# Patient Record
Sex: MALE | Race: BLACK OR AFRICAN AMERICAN | Employment: OTHER | URBAN - METROPOLITAN AREA
[De-identification: names, ages, dates, MRNs, and addresses within clinical notes are randomized per-mention and may not be internally consistent; named-entity substitution may affect disease eponyms.]

---

## 2020-12-26 ENCOUNTER — HOME HEALTH ADMISSION (OUTPATIENT)
Dept: HOME HEALTH SERVICES | Facility: HOME HEALTH | Age: 70
End: 2020-12-26

## 2023-07-14 ENCOUNTER — HOME HEALTH ADMISSION (OUTPATIENT)
Age: 73
End: 2023-07-14
Payer: COMMERCIAL

## 2023-07-16 ENCOUNTER — HOME CARE VISIT (OUTPATIENT)
Age: 73
End: 2023-07-16
Payer: COMMERCIAL

## 2023-07-16 PROCEDURE — 0221000100 HH NO PAY CLAIM PROCEDURE

## 2023-07-16 PROCEDURE — G0299 HHS/HOSPICE OF RN EA 15 MIN: HCPCS

## 2023-07-17 VITALS
TEMPERATURE: 97.5 F | OXYGEN SATURATION: 98 % | SYSTOLIC BLOOD PRESSURE: 110 MMHG | RESPIRATION RATE: 14 BRPM | DIASTOLIC BLOOD PRESSURE: 60 MMHG | HEART RATE: 78 BPM

## 2023-07-17 ASSESSMENT — ENCOUNTER SYMPTOMS
HEMOPTYSIS: 0
BOWEL INCONTINENCE: 1
PAIN LOCATION - PAIN QUALITY: BURNING
STOOL DESCRIPTION: SOFT FORMED

## 2023-07-17 NOTE — CASE COMMUNICATION
SOC done on 7/16/23. Patient is quadrapeligic, he is cared very well by his sister. He has some wounds on bilateral lower legs. (See pics.)   im recommending DWC/Aquacel ag and DSD 3x/week. He has Power and manual Wheelchairs, and Summer lift and Hospital bed.

## 2023-07-17 NOTE — HOME HEALTH
importance of keeping thrasher bag below the level of the bladder. I also went over the need to empty bag when < 1/2 full. I stressed the importance of thorough thrasher care at meatus and making sure tubing does not kink. Patient is a fall risk, I recommend supervision at all times, keeping walk ways/halls clear of clutter. Patient to change positions frequently and keep skin clean dry and moisturized to prevent any skin breakdown, using a Zinc product on prominent areas. Keep dressings clean and dry. Observe for signs of infection. Wounds assessed and Pictures in chart. 4 wounds noted. Sacral decubiti is healed with no open areas. Patient level of understanding of education provided: Patient /CG with good understanding of education provided. Patient response to procedure performed: Patient was  uncomfortable with dressing changes, skin is very fragile. Home exercise program/Homework provided: Continue to eat a diet high in protein to promote healing. Keep pressure off prominent areas, keep skin clean and dry, change his position frequently, F/U with PCP and take medications as prescribed. Pt/Caregiver instructed on plan of care and are agreeable to plan of care at this time. Physician Angel Chacko notified of patient admission to home health and plan of care including anticipated frequency of visits and treatments/interventions/modalities of SN/PT/OT. Discharge planning discussed with patient and caregiver. Discharge planning as follows: Will discharge when education is completed,  patient is medically stable and wound is healed, or family can manage dressing. Rm Larsen Pt/Caregiver did verbalize understanding of discharge planning. Next MD appointment TBD  with Angel Chacko MD.  Patient/caregiver encouraged/instructed to keep appointment as lack of follow through with physician appointment could result in discontinuation of home care services for non-compliance.

## 2023-07-18 ENCOUNTER — HOME CARE VISIT (OUTPATIENT)
Age: 73
End: 2023-07-18
Payer: COMMERCIAL

## 2023-07-18 VITALS
OXYGEN SATURATION: 94 % | SYSTOLIC BLOOD PRESSURE: 120 MMHG | HEART RATE: 62 BPM | DIASTOLIC BLOOD PRESSURE: 62 MMHG | RESPIRATION RATE: 14 BRPM | TEMPERATURE: 96.8 F

## 2023-07-18 PROCEDURE — G0151 HHCP-SERV OF PT,EA 15 MIN: HCPCS

## 2023-07-18 NOTE — HOME HEALTH
chaning position every Hr for pressure relie  PATIENT EDUCATION PROVIDED THIS VISIT: safety, HEP, walking, deep breathing, Educated pt sister how to float B heels to help with pressure relief   HEP consisting of:  1. Change positon  every hour during the day   2. PROM/ AAROM program B LE daily   Written HEP issued, patient/caregiver verbalized understanding. CONTINUED NEED FOR THE FOLLOWING SKILLS: HH PT is medically necessary to  address pain, decreased ROM,, and impaired balance in order to improve functional independence, quality of life, return to PLOF, and reduce the risk for falls. PLAN: Pt will be seen to establish and upgrade  and educated caregivers in a HEP PROM/ AAROM B LE . Static sitting  balance re -education on the EOB  Reinforce pressure relieving technique . Reinforce general safety precautions  DISCHARGE PLANNING DISCUSSED: Discharge to self and family under MD supervision once all goals have been met or patient has reached max potential. Patient/caregiver verbalized understanding.

## 2023-07-20 ENCOUNTER — HOME CARE VISIT (OUTPATIENT)
Age: 73
End: 2023-07-20
Payer: COMMERCIAL

## 2023-07-20 VITALS
TEMPERATURE: 98.1 F | OXYGEN SATURATION: 95 % | HEART RATE: 77 BPM | SYSTOLIC BLOOD PRESSURE: 138 MMHG | DIASTOLIC BLOOD PRESSURE: 80 MMHG

## 2023-07-20 PROCEDURE — G0157 HHC PT ASSISTANT EA 15: HCPCS

## 2023-07-20 ASSESSMENT — ENCOUNTER SYMPTOMS: PAIN LOCATION - PAIN QUALITY: SHARP

## 2023-07-21 ENCOUNTER — HOME CARE VISIT (OUTPATIENT)
Age: 73
End: 2023-07-21
Payer: COMMERCIAL

## 2023-07-21 PROCEDURE — G0300 HHS/HOSPICE OF LPN EA 15 MIN: HCPCS

## 2023-07-21 NOTE — HOME HEALTH
SUBJECTIVE: The pt reports increased neck pain with all mvmts especially with LLE ROM. His sister states that this is not new and that this is what he says when he does not want to do something. Kat Garcia PAIN: see pain assessment  OBJECTIVE: see interventions  . NEXT MD APPT: 7/25/23  . CAREGIVER ASSISTANCE NEEDED FOR: The pt lives with his sister who is his primary CG and he also has a paid CG to assist. He is D with all care. .  ASSESSMENT AND PROGRESS TOWARD GOALS:  The pt had difficulty tolerating participation in today's treatment secondary to increased neck pain with all mvmts especially ROM of the LLE. The pt/CG will benefit from continued HHPT to progress the CGs ability to safely (for her and the pt) perform ROM and progress towards sitting over the EOB, reducing the pts risks of skin breakdown and pain/stiffness. PATIENT RESPONSE TO TREATMENT: increased neck pain with activity. PATIENT LEVEL OF UNDERSTANDING OF EDUCATION: The pts sister is able to teach back pressure relief techniques and ROM of the joints after education. [address] LAST COMPLETED ON:  The pts status and POC discussed with Scott Watkins PT on 7/19/23. Kat Garcia PLAN: Plan to review the ROM and sit over the EOB, if tolerated, next visit. DISCHARGE PLANNING DISCUSSED: Discharge to self and family under MD supervision once all goals have been met or patient has reached maximum potential. Discussed with the pt and the pts sister the POC to continue HHPT with the remaining frequency of 2w2. Both are in agreement to the POC at this time.

## 2023-07-22 ENCOUNTER — HOME CARE VISIT (OUTPATIENT)
Age: 73
End: 2023-07-22
Payer: COMMERCIAL

## 2023-07-24 ENCOUNTER — HOME CARE VISIT (OUTPATIENT)
Age: 73
End: 2023-07-24
Payer: COMMERCIAL

## 2023-07-24 VITALS
SYSTOLIC BLOOD PRESSURE: 122 MMHG | DIASTOLIC BLOOD PRESSURE: 80 MMHG | HEART RATE: 67 BPM | OXYGEN SATURATION: 96 % | TEMPERATURE: 98 F

## 2023-07-24 PROCEDURE — G0300 HHS/HOSPICE OF LPN EA 15 MIN: HCPCS

## 2023-07-24 PROCEDURE — G0157 HHC PT ASSISTANT EA 15: HCPCS

## 2023-07-24 ASSESSMENT — ENCOUNTER SYMPTOMS: STOOL DESCRIPTION: SOFT FORMED

## 2023-07-24 NOTE — HOME HEALTH
Skilled reason for visit:Wound Care, Hypertension, Catheter Care, Infection Prevention, Skin Impairment, and Medication Education     Caregiver involvement: Patient's sister is his primary caregiver and she schedules his appointments, and schedules his transportation to and from his appointments. Patients sister  his medications and bathes patient bathes. Patient has a [place] Catheter for urination and is changed whenever he soils himself. His sister  prepares her meals and feeds patient as well. Medications reviewed and all medications ARE NOT available in the home this visit. The following education was provided regarding medications: Plavix- Caregiver was educated on this medication and the purpose of it. Patient/Caregiver verbalized understanding    MD notified of any discrepancies/look a-like medications/medication interactions: NA    Medications are EFFECTIVE at this time. Home health supplies by type and quantity ordered/delivered this visit include: Supplies in Home    Patient education provided this visit: See interventions Tab. Sharps education provided: NA    Patient level of understanding of education provided: Patient verbalized understanding to all education in interventions tab. Skilled Care Performed this visit: Vital Assessment, Disease, Medication, Safety and Infection Prevention, Education and Management    Patient response to procedure performed:   Patient tolerated vital assessment well and no facial grimaces or complaints of pain or discomfort. Agency Progress toward goals: Agency staff is progressing well with patient as patient verbalizes being able to better manage her disease processes with the education received from home care staff. Patient's Progress towards personal goals:  Patient is progressing slowly as wounds are still bleeding and will need more time to heel.     Home exercise program: Patients caregiver will increase his protein intake in his diet to

## 2023-07-26 ENCOUNTER — HOME CARE VISIT (OUTPATIENT)
Age: 73
End: 2023-07-26
Payer: COMMERCIAL

## 2023-07-26 VITALS
TEMPERATURE: 98 F | OXYGEN SATURATION: 96 % | HEART RATE: 67 BPM | SYSTOLIC BLOOD PRESSURE: 122 MMHG | DIASTOLIC BLOOD PRESSURE: 80 MMHG

## 2023-07-26 PROCEDURE — G0300 HHS/HOSPICE OF LPN EA 15 MIN: HCPCS

## 2023-07-26 ASSESSMENT — ENCOUNTER SYMPTOMS: PAIN LOCATION - PAIN QUALITY: ACHE, SHARP

## 2023-07-26 NOTE — HOME HEALTH
SUBJECTIVE: The pts CG not available during today's visit. Discussed with the pt that CG needs to be available for visits for teaching. Diamond Hamman PAIN: see pain assessment    OBJECTIVE: see interventions  . NEXT MD APPT: ROM  . CAREGIVER ASSISTANCE NEEDED FOR: The pt lives with his sister who is his primary CG and he also has a paid CG to assist. He is D with all care. .  ASSESSMENT AND PROGRESS TOWARD GOALS:  The pt demonstrated a fair result in HHPT on this date due to posterior pushing with the attempts for supine to sit transfers. He continues to present with wounds that require increased CG assistance. He will benefit from increased frequency of pressure relief techniques to assist with wound healing and reducing the risk of further skin breakdown. PATIENT RESPONSE TO TREATMENT: posterior pushing with inability to safely perform supine to sit    PATIENT/CG LEVEL OF UNDERSTANDING OF EDUCATION: CG unavailable on this date. [address] LAST COMPLETED ON:  The pts status and POC discussed with Rain Simon LPN on this date. Diamond Hamman PLAN: PROM and sitting balance next visit. Diamond Hamman DISCHARGE PLANNING DISCUSSED: Discharge to self and family under MD supervision once all goals have been met or patient has reached maximum potential. Discussed with the pt and the pts sister the POC to continue HHPT with the remaining frequency of 1 more visit this week then 2w1. Both are in agreement to the POC at this time.

## 2023-07-27 VITALS
RESPIRATION RATE: 18 BRPM | HEART RATE: 87 BPM | DIASTOLIC BLOOD PRESSURE: 66 MMHG | OXYGEN SATURATION: 100 % | SYSTOLIC BLOOD PRESSURE: 110 MMHG | TEMPERATURE: 98.1 F

## 2023-07-27 ASSESSMENT — ENCOUNTER SYMPTOMS: BOWEL INCONTINENCE: 1

## 2023-07-27 NOTE — HOME HEALTH
Skilled reason for visit Wound Care                 Caregiver involvement: Patient sister is primary CG assist with all level of care           Medications reviewed and all medications are available in the home this visit. The following education was provided regarding medication Medication reviewed no changes question or concerns       MD notified of any discrepancies/look a-like medications/medication interactions: n/a         Medications are effective at this time. Home health supplies by type and quantity ordered/delivered this visit include: n/a         Patient education provided this visit: see care plan intervention            sharps education provided n/a  Patient level of understanding of education provided:  see care plan intervention    Patient response to procedure performed:  Tolerated well           Agency Progress toward goals: Continue to promote wound healing          Patient's Progress towards personal goals: progressing             Home exercise program: Keep wound clean dry and covered between visits          Continued need for the following skills: Nursing         Plan for next visit: wound care      Patient and/or caregiver notified and agrees to changes in the Plan of Care YES/NO/NA: YES        The following discharge planning was discussed with the pt/caregiver: D/C when wound healed

## 2023-07-28 ENCOUNTER — HOME CARE VISIT (OUTPATIENT)
Age: 73
End: 2023-07-28
Payer: COMMERCIAL

## 2023-07-28 VITALS
OXYGEN SATURATION: 97 % | TEMPERATURE: 97.8 F | HEART RATE: 66 BPM | DIASTOLIC BLOOD PRESSURE: 88 MMHG | SYSTOLIC BLOOD PRESSURE: 142 MMHG

## 2023-07-28 PROCEDURE — G0157 HHC PT ASSISTANT EA 15: HCPCS

## 2023-07-28 PROCEDURE — G0300 HHS/HOSPICE OF LPN EA 15 MIN: HCPCS

## 2023-07-28 NOTE — HOME HEALTH
SUBJECTIVE: The pt lying in the bed upon today's arrival. He has a friend present. His sister and the paid CG are not available on this date. He states his paid CG is present in the morning, scheduled the next PT visit in the morning for CG education. The pt states he spent 5 hours in the recliner yesterday. He states his sister and the CG are (I) with the ues of the sergei lift to transfer him. Deja Zayas PAIN: see pain assessment  OBJECTIVE: see interventions  . NEXT MD APPT: ROM  . CAREGIVER ASSISTANCE NEEDED FOR: The pt lives with his sister who is his primary CG and he also has a paid CG to assist. He is D with all care. .  ASSESSMENT AND PROGRESS TOWARD GOALS:  The pt unable to tolerate supine to sit transition, he continues to present with limited motion reducing the ability to transfer, requiring D assistance for all mobility. He will benefit from [facility] education for daily ROM. He continues to have decreased tolerance to PROM of the hips due to increased pain. PATIENT RESPONSE TO TREATMENT: attempted supine to sit transition again today without success due to posterior pushing. PATIENT/CG LEVEL OF UNDERSTANDING OF EDUCATION: CG unavailable on this date. Deja Zayas PLAN: CG education for PROM  . DISCHARGE PLANNING DISCUSSED: Discharge to self and family under MD supervision once all goals have been met or patient has reached maximum potential. Discussed with the pt and the pts sister the POC to continue HHPT with the remaining frequency of  2w1. Both are in agreement to the POC at this time.

## 2023-07-30 ASSESSMENT — ENCOUNTER SYMPTOMS: PAIN LOCATION - PAIN QUALITY: SORE, ACHE

## 2023-07-31 ENCOUNTER — HOME CARE VISIT (OUTPATIENT)
Age: 73
End: 2023-07-31
Payer: COMMERCIAL

## 2023-07-31 VITALS
OXYGEN SATURATION: 95 % | SYSTOLIC BLOOD PRESSURE: 130 MMHG | HEART RATE: 83 BPM | DIASTOLIC BLOOD PRESSURE: 58 MMHG | RESPIRATION RATE: 17 BRPM | TEMPERATURE: 97.6 F

## 2023-07-31 PROCEDURE — G0299 HHS/HOSPICE OF RN EA 15 MIN: HCPCS

## 2023-07-31 ASSESSMENT — ENCOUNTER SYMPTOMS
PAIN LOCATION - PAIN QUALITY: ACHIN
BOWEL INCONTINENCE: 1

## 2023-07-31 NOTE — HOME HEALTH
Skilled reason for visit: wound care    Caregiver involvement: sister perform wound care non nursing days for breakthrough drainage. Medications reviewed and all medications are available in the home this visit. The following education was provided regarding medications:  reviewed all medication bottles in home for frequency, side effects and precautions. verbalized understanding        Medications are effective at this time. Home health supplies by type and quantity ordered/delivered this visit include: wound care supplies ordered. Patient education provided this visit: see intervention tab. Patient level of understanding of education provided: see intervention tab for level of understanding. Patient response to procedure performed:  tolerated with mild complaints of pain during wound care. Agency Progress toward goals: see intervention tab for progressing toward goals          Patient's Progress towards personal goals: see intervention tab for progressing toward goals          Home exercise program: cont to take all medications/diet as prescribed, follow pressure ulcer precautions, follow all fall precautions and use any assistive devices recommended by therapy or medical providers, reported any abnormal s/sx of  infection/UTI/Sepsis to ER immediately. disease process teaching packets/ home care booklet for reference. call home care for any concerns/questions. keep all medical appts. Continued need for the following skills: Nursing. Plan for next visit: wound care    Patient and/or caregiver notified and agrees to changes in the Plan of Care: N/A.      The following discharge planning was discussed with the pt/caregiver: when all wound care goals are met

## 2023-08-01 ENCOUNTER — HOME CARE VISIT (OUTPATIENT)
Age: 73
End: 2023-08-01
Payer: COMMERCIAL

## 2023-08-01 PROCEDURE — G0157 HHC PT ASSISTANT EA 15: HCPCS

## 2023-08-02 ENCOUNTER — HOME CARE VISIT (OUTPATIENT)
Age: 73
End: 2023-08-02
Payer: COMMERCIAL

## 2023-08-02 VITALS
HEART RATE: 78 BPM | SYSTOLIC BLOOD PRESSURE: 126 MMHG | TEMPERATURE: 96.9 F | DIASTOLIC BLOOD PRESSURE: 72 MMHG | RESPIRATION RATE: 16 BRPM | OXYGEN SATURATION: 99 %

## 2023-08-02 VITALS
HEART RATE: 66 BPM | TEMPERATURE: 98.1 F | OXYGEN SATURATION: 99 % | SYSTOLIC BLOOD PRESSURE: 122 MMHG | DIASTOLIC BLOOD PRESSURE: 84 MMHG

## 2023-08-02 PROCEDURE — G0151 HHCP-SERV OF PT,EA 15 MIN: HCPCS

## 2023-08-02 PROCEDURE — G0152 HHCP-SERV OF OT,EA 15 MIN: HCPCS

## 2023-08-02 PROCEDURE — G0300 HHS/HOSPICE OF LPN EA 15 MIN: HCPCS

## 2023-08-02 ASSESSMENT — ENCOUNTER SYMPTOMS
PAIN LOCATION - PAIN QUALITY: BURNING
PAIN LOCATION - PAIN QUALITY: SHARP, ACHE

## 2023-08-02 NOTE — HOME HEALTH
SUBJECTIVE: Pt reports that he is doing well today. REQUIRES CAREGIVER ASSISTANCE FOR: transportation, medication, ADLS. IADLS   MEDICATIONS REVIEWED AND RECONCILED no changes   NEXT MD APPT:  DR. Askew Daughters: R hip flexors, ADD/ABD 3/5 R quads and hamstrings -3/5 R DF 1/5 PF +1/5  L hip flexors -2/5 ADD/ABD -2/5 quads +1/5 hamstrings 1/5 DF0/5 PF 2/5   WOUNDS:Pt has wounds on B lower legs that are clean dry and intact and L lower leg wrapped with ace wrap R LE wrapped with kerlix    BED MOBILITY:pt is dependent with all bed mobilty skills   TRANSFERS:Per PTA visit 7/24/23 attempted supine to sit transfers without success as the pt pushed posteriorly. Pt sister repots that she is I with sergei lift transfers    BALANCE: Pt was unable to sit on the EOB  PATIENT RESPONE TO TX: Pt pain level remained the same throghout tx session   PATIENT LEVEL OF UNDERSTANDING OF EDUCATION PROVIDED Educated pt and family to change pt position every HR for pressure relief   PATIENT EDUCATION PROVIDED THIS VISIT: safety, HEP, walking, deep breathing,        Patient is s/p Quadriplegia and has been treated for establishing and education in  PROM/ AAROM progam. Education in proper positioning and pressure relieving program. Pt was dependent for all functional mobility. Pt sister Gege Newsome verbalzied understaning of PROM/ARROM program. B LE. Pt sister verbalized I using the sergei lift to A with transfers to power wc and relciner chair. Gege Newsome reported that they were had no questions with reguards to sergei lift. Discussed changing the postion of the bed a pt has a R gauze preference. Charisse Shaffer reports that they are going to move the bed to the other side of the room. On [facility] R hip flexors, ADD/ABD 3/5 R quads and hamstrings -3/5 R DF 1/5 PF +1/5  L hip flexors -2/5 ADD/ABD -2/5 quads +1/5 hamstrings 1/5 DF0/5 PF 2/5.  Today at DC strength is R hip flexors, ADD/ABD 3/5 R quads and hamstrings -3/5 R DF 1/5 PF +1/5  L hip flexors -2/5

## 2023-08-02 NOTE — CASE COMMUNICATION
At this time the pt is unable to sit over the EOB due to decreased trunk ROM. His sister is able to perform PROM and demonstrates good awareness for pressure relief with changing of posiitons. He will benefit from daily stretching to improve joint mobility and assist with pain management. I suggested that the pts sister move the bed as he is leaned to the R more often than not when I arrive due to the TV being to his right.

## 2023-08-02 NOTE — HOME HEALTH
SUBJECTIVE: The pt reports an ER visit this weekend. Has UTI. Mini Grijalva PAIN: see pain assessment    OBJECTIVE: see interventions  . NEXT MD APPT: ROM  . CAREGIVER ASSISTANCE NEEDED FOR: The pt lives with his sister who is his primary CG and he also has a paid CG to assist. He is D with all care. .  ASSESSMENT AND PROGRESS TOWARD GOALS:  At this time the pt is unable to sit over the EOB due to decreased trunk ROM. His sister is able to perform PROM and demonstrates good awareness for pressure relief with changing of posiitons. He will benefit from daily stretching to improve joint mobility and assist with pain management. PATIENT RESPONSE TO TREATMENT: attempted supine to sit transition again today without success due to posterior pushing. PATIENT/CG LEVEL OF UNDERSTANDING OF EDUCATION: Good understanding of pressure relief and PROM/stretching from the pts sister via teach back and demonstration. Mini Plants PLAN: Reassessment with anticipated DC next visit with Sherice Myers PT next visit. Mini Plants DISCHARGE PLANNING DISCUSSED: Discharge to self and family under MD supervision once all goals have been met or patient has reached maximum potential. Discussed with the pt and the pts sister the POC to continue HHPT with the remaining frequency of 1 more visit this week. Both are in agreement to the POC at this time.

## 2023-08-03 VITALS
HEART RATE: 78 BPM | DIASTOLIC BLOOD PRESSURE: 72 MMHG | OXYGEN SATURATION: 99 % | RESPIRATION RATE: 16 BRPM | TEMPERATURE: 96.9 F | SYSTOLIC BLOOD PRESSURE: 126 MMHG

## 2023-08-03 ASSESSMENT — ENCOUNTER SYMPTOMS: PAIN LOCATION - PAIN QUALITY: DULL

## 2023-08-03 NOTE — HOME HEALTH
Skilled Needs: Wound Care and education   Caregiver involvement: Pt is dependent on caregiver for all ADLS, medication and appointments   Medications reviewed and all medications are available in the home this visit. The following education was provided regarding medications:  DANIEL RITCHIE notified of any discrepancies/look a-like medications/medication interactions: DANIEL  Medications are effecrtive at this time. Home health supplies by type and quantity ordered/delivered this visit include:  Supplies ordered and available at this time   Nurse complete wound care with the assistance of caregiver. Minimal pain voiced during dressing change. Nurse educated sister on s/s of infection in wound and when to call MD. Sister inquired about thrasher changes if complications and can nursing complete it. Will call MD for order for PRN thrasher changes educated on s/s of infection to include  chills, increased heart/respiratory rate, uncontrolled pain/tenderness, drainage:green/yellow/brown, or odor to MD immediately. Set days and times for future viists with sister.    Sharps education provided: DANIEL  Patient level of understanding of education provided: Jayashree [place] all understanding to above education  Skilled Care Performed this visit: Education and Wound Care  Patient response to procedure performed: Minimal pain during dressing change   Agency Progress toward goals: Progressing toward interventions above  Patient's Progress towards personal goals: when patient reaches goals and medication is managed, and disease processes are understood patient agrees and understand that discharge will take place

## 2023-08-03 NOTE — CASE COMMUNICATION
Occupational Therapy Evaluation    1w1    Mr. Gautam Dean is not ambulatory. He requires dependent assistance for all bed mobility and transfers to the w/c. Sister Bahman Wynne reports independence with assisting pt with all bed mobility and use of the sergei lift for transfers to the w/c. Mr. Gautam Dean is dependent for all ADL tasks to include grooming, bathing, dressing and toileting. All ADL completed with pt in supine for pt and caregiver saf etalan. Mr. Gautam Dean is able to bring his L hand to his mouth, but is unable to grasp items with his hand due to severe contracture and joint limitations. PROM of the L UE is severely limited. Shoulder able to passively lift approximately 20% range. Elbow lacks full extension and rests in flexion. Able to extend 40% range. Wrist and fingers fixed in neutral.  Sister reports that she sometimes wedges a cookie/food between his fingers an d allows him to feed himself. However, pt is unable to manipulate food in his hand unassisted. The R UE is fixed in extension with very minimal movement during attempts at PROM for flexion. Explained to pt and sister that daily PROM within available range is needed to prevent further joint contractures and to promote ease during ADL tasks. Offered to pick pt up for further OT visits to instruct caregivers on how to perform ROM while  managing pt's muscle tone along with ADL training. Sister states that she does not need training as she is independent doing this. She denies doing the ROM daily but states that she does it several times a week. Bahman Wynne explains that is why they wanted therapy orders. So therapy could come and do the exercises with Mr. Gautam Dean. Explained that doing only ROM daily is not a skilled service. Explained the skill is to instruct caregiv ers with plan for them to carryover daily. Bahman Wynne is asking OT to help pt be able to improve pts ROM so that he can functionally use his UE with daily tasks.   Unfortunately, pt has

## 2023-08-03 NOTE — HOME HEALTH
Caregiver involvement: Junior Sevilla (sister) is pt's caregiver and assists with all ADL. Medications reviewed and all medications are available in the home this visit. The following education was provided regarding medications, medication interactions, and look alike medications (specify): Continue as directed by MD.    Medications  are effective at this time. Patient education provided this visit: see ADL note    Sharps education provided:  na    Patient level of understanding of education provided: see ADL note    Skilled Care Performed this visit: Completed OT evaluation and assessment for safety with ADL and mobility. Patient response to procedure performed:  Mr. Mackenzie Calderon had a fair response to therapy. He was agreeable to participate but unable to tolerate full PROM due to spasticity and pain. Patient's Progress towards personal goals: Mr. Mackenzie Calderon has poor rehab potential.  He is unable to actively participate due to severe spasticity and limited mobility. He requires full caregiver support; however, caregiver declines further OT for caregiver training at this time. Pt is dependent for all ADL and mobility. Caregiver reports independence in their ability to assist pt. Home exercise program: Caregiver instructed to perform PROM exercises daily for B UE. Exercises included shoulder flexion/extension, elbow flexion/extension, supination/pronation, wrist flexion/extension and finger flexion/extension. Instructed caregiver to complete PROM daily for improving ROM, circulation, and managing edema. Continued need for the following skills: SN    Discharge Plans:  1w1 with plans to discharge to self. No further skilled OT is indicated at this time.     PMHx: List of Comorbidities: thrasher catheter   Heartburn, HTN, HLD, CVA, STEMI    DME       Electric wheelchair   Manual wheelchair   [facility] bed   [address] [facility]   Urinary Catheter Supplies

## 2023-08-04 ENCOUNTER — HOME CARE VISIT (OUTPATIENT)
Age: 73
End: 2023-08-04
Payer: COMMERCIAL

## 2023-08-04 VITALS
OXYGEN SATURATION: 98 % | HEART RATE: 78 BPM | SYSTOLIC BLOOD PRESSURE: 110 MMHG | RESPIRATION RATE: 18 BRPM | DIASTOLIC BLOOD PRESSURE: 60 MMHG | TEMPERATURE: 97.1 F

## 2023-08-04 PROCEDURE — G0300 HHS/HOSPICE OF LPN EA 15 MIN: HCPCS

## 2023-08-05 NOTE — HOME HEALTH
Skilled Needs: Education and Wound Care   Caregiver involvement: Pt independent with care with the exception of wound treatment due to location   Medications reviewed and all medications are available in the home this visit. The following education was provided regarding medications:  DANIEL RITCHIE notified of any discrepancies/look a-like medications/medication interactions: DANIEL  Medications are effecrtive at this time. Home health supplies by type and quantity ordered/delivered this visit include:  Supplies available   Patient education provided this visit: Wound care completed as orderd. Minimal Pain voiced this visit. Pt was in bed with no discress noted at all. Reviewed to reported any redness, swelling, warmth, fever, chills, increased heart/respiratory rate, uncontrolled pain/tenderness, drainage:green/yellow/brown, or odor to MD immediately. [place]leton draining with no issues and no abdominal pain voiced from patient.    Sharps education provided: DANIEL  Patient level of understanding of education provided:  [place] all understanding to above education  Skilled Care Performed this visit: Education and Wound Care  Patient response to procedure performed: Minimal pain during dressing change   Agency Progress toward goals: Progressing toward interventions above  Patient's Progress towards personal goals: when patient reaches goals and medication is managed, and disease processes are understood patient agrees and understand that discharge will take place

## 2023-08-07 ENCOUNTER — HOME CARE VISIT (OUTPATIENT)
Age: 73
End: 2023-08-07
Payer: COMMERCIAL

## 2023-08-07 VITALS
DIASTOLIC BLOOD PRESSURE: 80 MMHG | HEART RATE: 76 BPM | RESPIRATION RATE: 18 BRPM | SYSTOLIC BLOOD PRESSURE: 122 MMHG | TEMPERATURE: 97.1 F | OXYGEN SATURATION: 98 %

## 2023-08-07 PROCEDURE — G0300 HHS/HOSPICE OF LPN EA 15 MIN: HCPCS

## 2023-08-07 ASSESSMENT — ENCOUNTER SYMPTOMS: PAIN LOCATION - PAIN QUALITY: BURNING

## 2023-08-08 NOTE — HOME HEALTH
Skilled Needs: Education and Wound Care   Caregiver involvement: Pt dependent with care from family    Medications reviewed and all medications are available in the home this visit. The following education was provided regarding medications:  DANIEL RITCHIE notified of any discrepancies/look a-like medications/medication interactions: DANIEL  Medications are effecrtive at this time. Home health supplies by type and quantity ordered/delivered this visit include:  Supplies available   Patient education provided this visit:  Nurse arrived pt in bed no distress noted. Wound care compeleted as ordered. Pts thrasher was cloudy with sediment, flushed thrasher with no resistance or concerns at this time. SUpplies ordered for thrasher care and wound care.    Sharps education provided: DANIEL  Patient level of understanding of education provided: Terisa Fat all understanding to above education  Skilled Care Performed this visit: Education and Wound Care  Patient response to procedure performed: Minimal pain during dressing change   Agency Progress toward goals: Progressing toward interventions above  Patient's Progress towards personal goals: when patient reaches goals and medication is managed, and disease processes are understood patient agrees and understand that discharge will take place

## 2023-08-09 ENCOUNTER — HOME CARE VISIT (OUTPATIENT)
Age: 73
End: 2023-08-09
Payer: COMMERCIAL

## 2023-08-09 PROCEDURE — G0300 HHS/HOSPICE OF LPN EA 15 MIN: HCPCS

## 2023-08-10 VITALS
TEMPERATURE: 97.4 F | OXYGEN SATURATION: 99 % | SYSTOLIC BLOOD PRESSURE: 108 MMHG | RESPIRATION RATE: 16 BRPM | DIASTOLIC BLOOD PRESSURE: 70 MMHG | HEART RATE: 87 BPM

## 2023-08-10 NOTE — HOME HEALTH
Skilled Needs: Education and Wound Care   Caregiver involvement: Pt dependent with care due to DX and confined to the bed. Medications reviewed and all medications are available in the home this visit. The following education was provided regarding medications:  DANIEL  MD notified of any discrepancies/look a-like medications/medication interactions: DANIEL  Medications are effecrtive at this time. Home health supplies by type and quantity ordered/delivered this visit include:  Supplies available   Patient education provided this visit:  Reviewed to reported any redness, swelling, warmth, fever, chills, increased heart/respiratory rate, uncontrolled pain/tenderness, drainage:green/yellow/brown, or odor to MD immediately. Wound care completed with the assistance of pts sister who is very helpful with all visits. No pain voiced this viist wound showing signs of improvemnt as evident in decrease in bleeding during dressing changes. Will continue to monitor progress 2x a week starting next week. Wound care orders have been updated and supplies have arrived as ordered. Caregiver and patient are both aware of change and reasoning why. both verbalized understanding and ok with new change. Pt stil waiting to find out when going to have superpubic thrasher placed. CG will keep nurse updated.    Pancho education provided: DANIEL  Patient level of understanding of education provided: Leopoldo Schilder all understanding to above education  Skilled Care Performed this visit: Education and Wound Care  Patient response to procedure performed: Minimal pain during dressing change   Agency Progress toward goals: Progressing toward interventions above  Patient's Progress towards personal goals: when patient reaches goals and medication is managed, and disease processes are understood patient agrees and understand that discharge will take place

## 2023-08-11 ENCOUNTER — HOME CARE VISIT (OUTPATIENT)
Age: 73
End: 2023-08-11
Payer: COMMERCIAL

## 2023-08-11 VITALS
DIASTOLIC BLOOD PRESSURE: 72 MMHG | TEMPERATURE: 98.8 F | HEART RATE: 69 BPM | TEMPERATURE: 98.6 F | SYSTOLIC BLOOD PRESSURE: 119 MMHG | OXYGEN SATURATION: 94 % | HEART RATE: 74 BPM | OXYGEN SATURATION: 98 % | RESPIRATION RATE: 18 BRPM | RESPIRATION RATE: 18 BRPM

## 2023-08-11 VITALS
TEMPERATURE: 97.1 F | RESPIRATION RATE: 16 BRPM | DIASTOLIC BLOOD PRESSURE: 70 MMHG | OXYGEN SATURATION: 97 % | HEART RATE: 67 BPM | SYSTOLIC BLOOD PRESSURE: 106 MMHG

## 2023-08-11 PROCEDURE — G0300 HHS/HOSPICE OF LPN EA 15 MIN: HCPCS

## 2023-08-11 ASSESSMENT — ENCOUNTER SYMPTOMS
CONSTIPATION: 1
PAIN LOCATION - PAIN QUALITY: SHARP
PAIN LOCATION - PAIN QUALITY: SHARP

## 2023-08-11 NOTE — HOME HEALTH
Skilled reason for visit: Wound care and patient education    Caregiver involvement: caregiver directly with patient care. Medications reviewed and all medications are available in the home this visit. The following education was provided regarding medications:  Continue current regimen as ordered . MD notified of any discrepancies/look a-like medications/medication interactions: n/a  Medications are effective at this time. Home health supplies by type and quantity ordered/delivered this visit include: none    Patient education provided this visit: repositioning every 2 hrs as tolerated on side    Sharps education provided: n/a    Patient level of understanding of education provided: Teach back method provided by caregiver    Patient response to procedure performed:  Teach performed by caregiver following care procedure    Agency Progress toward goals: progressing    Patient's Progress towards personal goals: progressing    Home exercise program: Complete upper extremity ROM to avoid further contractures of upper extremities as tolerated    Continued need for the following skills: Nursing. Plan for next visit: wound care and patient education    Patient and/or caregiver notified and agrees to changes in the Plan of Care: Yes.      The following discharge planning was discussed with the pt/caregiver: Discharging planning not discussed during visit

## 2023-08-11 NOTE — HOME HEALTH
Skilled Needs: Education and Wound Care   Caregiver involvement: Pt dependent with care   Medications reviewed and all medications are available in the home this visit. The following education was provided regarding medications:  DANIEL RITCHIE notified of any discrepancies/look a-like medications/medication interactions: DANIEL  Medications are effecrtive at this time. Home health supplies by type and quantity ordered/delivered this visit include:  Supplies available   Patient education provided this visit:  Nurse arrived pts CG had gone with her grandkids but let all wound care supplies out for nurse. No distress noted this visit, pt had no complaints of pain this viist during dressing change. [place] flowing with no concerns at this time. VS WNL, Pt had no needs before nurse left.    Sharps education provided: DANIEL  Patient level of understanding of education provided: Armando Dawson all understanding to above education  Skilled Care Performed this visit: Education and Wound Care  Patient response to procedure performed: Minimal pain during dressing change   Agency Progress toward goals: Progressing toward interventions above  Patient's Progress towards personal goals: when patient reaches goals and medication is managed, and disease processes are understood patient agrees and understand that discharge will take place

## 2023-08-15 ENCOUNTER — HOME CARE VISIT (OUTPATIENT)
Age: 73
End: 2023-08-15
Payer: COMMERCIAL

## 2023-08-15 VITALS
OXYGEN SATURATION: 96 % | SYSTOLIC BLOOD PRESSURE: 112 MMHG | TEMPERATURE: 96.4 F | RESPIRATION RATE: 18 BRPM | DIASTOLIC BLOOD PRESSURE: 62 MMHG | HEART RATE: 67 BPM

## 2023-08-15 PROCEDURE — G0300 HHS/HOSPICE OF LPN EA 15 MIN: HCPCS

## 2023-08-15 NOTE — HOME HEALTH
Skilled Needs: Education and Wound Care   Caregiver involvement: Pt dependent with care from family and caregivers due to [place]   Medications reviewed and all medications are available in the home this visit. The following education was provided regarding medications:  DANIEL RITCHIE notified of any discrepancies/look a-like medications/medication interactions: NA  Medications are effecrtive at this time. Home health supplies by type and quantity ordered/delivered this visit include:  Supplies available   Patient education provided this visit:  Nurse arrived pt complaiants of thrasher hurting, nurse attempted to flush thrasher and was unsucessful. Thrasher changed today with 800cc of urine return. Wound care completed as ordered with minimal pain voiced this visit. Pts CG is concerns with lack of Physical therapy. Supplies available at this time.    Sharps education provided: DANIEL  Patient level of understanding of education provided: Marshall Osgood all understanding to above education  Skilled Care Performed this visit: Education and Wound Care  Patient response to procedure performed: Minimal pain during dressing change   Agency Progress toward goals: Progressing toward interventions above  Patient's Progress towards personal goals: when patient reaches goals and medication is managed, and disease processes are understood patient agrees and understand that discharge will take place

## 2023-08-18 ENCOUNTER — HOME CARE VISIT (OUTPATIENT)
Age: 73
End: 2023-08-18
Payer: COMMERCIAL

## 2023-08-18 VITALS
SYSTOLIC BLOOD PRESSURE: 128 MMHG | DIASTOLIC BLOOD PRESSURE: 70 MMHG | HEART RATE: 67 BPM | OXYGEN SATURATION: 96 % | RESPIRATION RATE: 18 BRPM | TEMPERATURE: 97.1 F

## 2023-08-18 PROCEDURE — G0300 HHS/HOSPICE OF LPN EA 15 MIN: HCPCS

## 2023-08-18 NOTE — HOME HEALTH
Skilled Needs: Education and Wound Care   Caregiver involvement:Pt totally dependent on caregiver due to DX  Medications reviewed and all medications are available in the home this visit. The following education was provided regarding medications:  DANIEL RITCHIE notified of any discrepancies/look a-like medications/medication interactions: DANIEL  Medications are effecrtive at this time. Home health supplies by type and quantity ordered/delivered this visit include:  Supplies available   Patient education provided this visit:  Reviewed to reported any redness, swelling, warmth, fever, chills, increased heart/respiratory rate, uncontrolled pain/tenderness, drainage:green/yellow/brown, or odor to MD immediately. Wound care completed as ordered with minimal pain voiced during change. Plenty of supplies available at this time.    Pancho education provided: DANIEL  Patient level of understanding of education provided: Kylee New all understanding to above education  Skilled Care Performed this visit: Education and Wound Care  Patient response to procedure performed: Minimal pain during dressing change   Agency Progress toward goals: Progressing toward interventions above  Patient's Progress towards personal goals: when patient reaches goals and medication is managed, and disease processes are understood patient agrees and understand that discharge will take place

## 2023-08-22 ENCOUNTER — HOME CARE VISIT (OUTPATIENT)
Age: 73
End: 2023-08-22
Payer: COMMERCIAL

## 2023-08-22 PROCEDURE — G0300 HHS/HOSPICE OF LPN EA 15 MIN: HCPCS

## 2023-08-23 VITALS
SYSTOLIC BLOOD PRESSURE: 110 MMHG | HEART RATE: 58 BPM | DIASTOLIC BLOOD PRESSURE: 68 MMHG | OXYGEN SATURATION: 95 % | RESPIRATION RATE: 16 BRPM

## 2023-08-23 NOTE — HOME HEALTH
Skilled Needs: Education and Wound Care   Caregiver involvement: Pt independent with care with the exception of wound treatment due to location   Medications reviewed and all medications are available in the home this visit. The following education was provided regarding medications:  DANIEL RITCHIE notified of any discrepancies/look a-like medications/medication interactions: DANIEL  Medications are effecrtive at this time. Home health supplies by type and quantity ordered/delivered this visit include:  Supplies available   Patient education provided this visit:  Reviewed to reported any redness, swelling, warmth, fever, chills, increased heart/respiratory rate, uncontrolled pain/tenderness, drainage:green/yellow/brown, or odor to MD immediately. Wound care completed as ordered. No pain voiced this visit and thrasher flowing with no concerns at this time. Nurse changed dressing to pts left leg and when cleaning the skin peeled off. [place] taken and MD called. Sister was advised as well.    Pancho education provided: DANIEL  Patient level of understanding of education provided: Chelsea Jaffe all understanding to above education  Skilled Care Performed this visit: Education and Wound Care  Patient response to procedure performed: Minimal pain during dressing change   Agency Progress toward goals: Progressing toward interventions above  Patient's Progress towards personal goals: when patient reaches goals and medication is managed, and disease processes are understood patient agrees and understand that discharge will take place

## 2023-08-25 ENCOUNTER — HOME CARE VISIT (OUTPATIENT)
Age: 73
End: 2023-08-25
Payer: COMMERCIAL

## 2023-08-25 VITALS
HEART RATE: 65 BPM | DIASTOLIC BLOOD PRESSURE: 72 MMHG | TEMPERATURE: 98.4 F | SYSTOLIC BLOOD PRESSURE: 120 MMHG | RESPIRATION RATE: 16 BRPM | OXYGEN SATURATION: 97 %

## 2023-08-25 PROCEDURE — G0300 HHS/HOSPICE OF LPN EA 15 MIN: HCPCS

## 2023-08-25 NOTE — HOME HEALTH
Skilled Needs: Education and Wound Care   Caregiver involvement: Pt dependent with all care due to DX and [employer]  Medications reviewed and called MD office to get an updated list of medications to help sister with med planner     The following education was provided regarding medications:  DANIEL  MD notified of any discrepancies/look a-like medications/medication interactions: DANIEL  Medications are effecrtive at this time. Home health supplies by type and quantity ordered/delivered this visit include:  Supplies available   Patient education provided this visit:  Nurse arrived today and Pt was not in the nest mood. WOund care was completed. Nurse educated pt that he has to be repositioned to helpprevent pressure ulcers. Pt voiced he is not in the mood sydnie turned he is fine. Wound care completed as ordered no pain voiced. legs look better then they did on tuesday. [place] draining with no concerns or issues at this time.    Sharps education provided: DANIEL  Patient level of understanding of education provided: Morgan Chester all understanding to above education  Skilled Care Performed this visit: Education and Wound Care  Patient response to procedure performed: Minimal pain during dressing change   Agency Progress toward goals: Progressing toward interventions above  Patient's Progress towards personal goals: when patient reaches goals and medication is managed, and disease processes are understood patient agrees and understand that discharge will take place

## 2023-08-29 ENCOUNTER — HOME CARE VISIT (OUTPATIENT)
Age: 73
End: 2023-08-29
Payer: COMMERCIAL

## 2023-08-29 VITALS
OXYGEN SATURATION: 98 % | RESPIRATION RATE: 18 BRPM | DIASTOLIC BLOOD PRESSURE: 70 MMHG | TEMPERATURE: 96.8 F | HEART RATE: 87 BPM | SYSTOLIC BLOOD PRESSURE: 140 MMHG

## 2023-08-29 PROCEDURE — G0300 HHS/HOSPICE OF LPN EA 15 MIN: HCPCS

## 2023-08-29 NOTE — HOME HEALTH
Skilled Needs: Education and Wound Care   Caregiver involvement: Pt dependent with care due to DX  Medications reviewed and all medications are available in the home this visit. The following education was provided regarding medications:  DANIEL RITCHIE notified of any discrepancies/look a-like medications/medication interactions: DANIEL  Medications are effecrtive at this time. Home health supplies by type and quantity ordered/delivered this visit include:  Supplies available   Patient education provided this visit:  Reviewed to reported any redness, swelling, warmth, fever, chills, increased heart/respiratory rate, uncontrolled pain/tenderness, drainage:green/yellow/brown, or odor to MD immediately. Unable to get pictures this visit but was able to see ligament showing on left posterior wound. Covered with petrolium gauze alginate abd pad and wrapped. Pt due to have super pubic thrasher placed tomorow it will be out patient. Pt sister had him transported to the ER over the weeked with suspecion of a stoke, pt was examined and sent home. VS WNL. Supplies to be ordered this evening for wound care.    Sharps education provided: DANIEL  Patient level of understanding of education provided: Gunner Vo all understanding to above education  Skilled Care Performed this visit: Education and Wound Care  Patient response to procedure performed: Minimal pain during dressing change   Agency Progress toward goals: Progressing toward interventions above  Patient's Progress towards personal goals: when patient reaches goals and medication is managed, and disease processes are understood patient agrees and understand that discharge will take place

## 2023-09-01 ENCOUNTER — HOME CARE VISIT (OUTPATIENT)
Age: 73
End: 2023-09-01
Payer: COMMERCIAL

## 2023-09-07 ENCOUNTER — HOME CARE VISIT (OUTPATIENT)
Age: 73
End: 2023-09-07
Payer: MEDICARE

## 2023-09-07 VITALS
TEMPERATURE: 99.7 F | SYSTOLIC BLOOD PRESSURE: 140 MMHG | DIASTOLIC BLOOD PRESSURE: 60 MMHG | OXYGEN SATURATION: 97 % | RESPIRATION RATE: 18 BRPM | HEART RATE: 76 BPM

## 2023-09-07 PROCEDURE — G0299 HHS/HOSPICE OF RN EA 15 MIN: HCPCS

## 2023-09-07 ASSESSMENT — ENCOUNTER SYMPTOMS
STOOL DESCRIPTION: FIRM
PAIN LOCATION - PAIN QUALITY: ACHING, SORE, SHARP
CONSTIPATION: 1

## 2023-09-09 ENCOUNTER — HOME CARE VISIT (OUTPATIENT)
Age: 73
End: 2023-09-09
Payer: MEDICARE

## 2023-09-09 PROCEDURE — G0299 HHS/HOSPICE OF RN EA 15 MIN: HCPCS

## 2023-09-09 NOTE — HOME HEALTH
walking on wet floors, Keep moving. Physical activity can go a long way toward fall prevention, Wear sensible shoes with backs on them, Use assistive devices. Report any s/sx of abnormal bleeding to MD immediately/seek medical treatment for any: black tary stools, dark/tea/blood, hemtoma, dizziness, frequent gum/nose bleeds, unusal brusing, or prolong bleeding. For plavix and aspirin - Take it at the same time each day. Take it with a full glass of water. You can take it with or without food. Use a pillbox to help keep track of your doses. If you miss a dose. Never take a double dose. Increased risk of bleeding. Increased bruising. Put on a pressure dressing if you cut yourself. If you cannot stop the bleeding, seek emergency help. Take tramadol with food. Swallow the capsule or tablet whole to avoid exposure to a potentially fatal overdose. Do not crush, chew, break, open, or dissolve. Monitor for respiratory depression and only take as directed by your dr. Side effects include Nausea, vomiting, constipation, lightheadedness, dizziness, or drowsiness may occur. Do not drink alcohol with this medication, do not operate heavy machinery. Pt/Caregiver instructed on plan of care and are agreeable to plan of care at this time. Physician Lucille Hernandez MD notified of patient admission to home health and plan of care including anticipated frequency of 2w2, 2prn and treatments/interventions/modalities of disease management, wound care, pain management and med management    Discharge planning discussed with patient and caregiver. Discharge planning as follows: discharge when all goals met. Pt/Caregiver did verbalize understanding of discharge planning. Next MD appointment TBD (date) with MD/NP/PA. Patient/caregiver encouraged/instructed to keep appointment as lack of follow through with physician appointment could result in discontinuation of home care services for non-compliance.

## 2023-09-11 ENCOUNTER — HOME CARE VISIT (OUTPATIENT)
Age: 73
End: 2023-09-11
Payer: MEDICARE

## 2023-09-11 VITALS
HEART RATE: 68 BPM | DIASTOLIC BLOOD PRESSURE: 62 MMHG | RESPIRATION RATE: 14 BRPM | TEMPERATURE: 98 F | SYSTOLIC BLOOD PRESSURE: 116 MMHG | OXYGEN SATURATION: 99 %

## 2023-09-11 ASSESSMENT — ENCOUNTER SYMPTOMS: PAIN LOCATION - PAIN QUALITY: THROBBING

## 2023-09-11 NOTE — HOME HEALTH
Will discarge when education is completed,  patient is medically stable and wound ius healed, or family can manage dressing. Yakov Espino

## 2023-09-12 ENCOUNTER — HOME CARE VISIT (OUTPATIENT)
Age: 73
End: 2023-09-12
Payer: MEDICARE

## 2023-09-12 VITALS
DIASTOLIC BLOOD PRESSURE: 64 MMHG | TEMPERATURE: 96.9 F | HEART RATE: 63 BPM | OXYGEN SATURATION: 99 % | SYSTOLIC BLOOD PRESSURE: 124 MMHG | RESPIRATION RATE: 16 BRPM

## 2023-09-12 PROCEDURE — G0151 HHCP-SERV OF PT,EA 15 MIN: HCPCS

## 2023-09-12 NOTE — HOME HEALTH
PMHx: Pt was recently in the hospital secondary to chronic UTI. sacral decubitus ulcer and osteomyelitis, Left lower extremity infected wounds vs stasis dermatitis with suspected early osteomyelitis. Heartburn, HTN, HLD, CVA, STEMI    SUBJECTIVE:It is nice to see you again. Pt reported that he got out the other day in his power wc and was able to go around the neighborhood. Pt reported that when he does sit up in his chair he is up for a couple of hrs. Pt noted that he does not get up in his chair everyday. Pt also noted that he is getting to MD appointments via stretcher. Pt sister did change pt room around so he does not have to flakito kimance to look at the TV. The TV is now placed directly infront of the pt   LIVING SITUATION: pt lives with sister in a [address] home. pt lives in a garage that made into a room for him. Pt has private duty aide assitance 5 days a week for 5 hrs   REQUIRES CAREGIVER ASSISTANCE FOR: transportation, medications, ADLS,IADLS   PLOF: Pt is dependent for all functional mobility. Pt is dependent for dressing and bathing. Pt does have personal aide assistance   MEDICATIONS REVIEWED AND RECONCILED: no changes   NEXT MD APPT:  9/12/23   EQUIPMENT:hospital bed. Custom manual wc, sergei lift   ROM: Pt has increased extenson tone B LE it was able to be broken with prolonged stretching. Pt has B wirst flexon contracturs with B fingers in extension. Pt R UE has an elbow extension contracture L UE elbow has a flexion contracture to about 90degress. pt has decreased shld flexion and ABD  STRENGTH:  R hip flexors, ADD/ABD 3/5 R quads and hamstrings -3/5 R DF 1/5 PF +1/5  L hip flexors -3/5 ADD/ABD -3/5 quads +/5 hamstrings 2/5 DF0/5 PF 2/5   WOUNDS: B LE distal bandages are clean dry and intact. However when sitting on the EOB  L LE wound began to activitly bleed. Pt sister reported that this happens everytime the pt sits up.  Fresh blood drainage was noted on bandage after

## 2023-09-13 ENCOUNTER — HOME CARE VISIT (OUTPATIENT)
Age: 73
End: 2023-09-13

## 2023-09-13 VITALS
TEMPERATURE: 99.7 F | RESPIRATION RATE: 18 BRPM | HEART RATE: 73 BPM | OXYGEN SATURATION: 96 % | DIASTOLIC BLOOD PRESSURE: 70 MMHG | SYSTOLIC BLOOD PRESSURE: 128 MMHG

## 2023-09-13 PROCEDURE — G0299 HHS/HOSPICE OF RN EA 15 MIN: HCPCS

## 2023-09-14 ASSESSMENT — ENCOUNTER SYMPTOMS
STOOL DESCRIPTION: FORMED
PAIN LOCATION - PAIN QUALITY: ACHING, SORE, SHARP
BOWEL INCONTINENCE: 1

## 2023-09-15 NOTE — HOME HEALTH
Physician Notification and Justification to Continue Services:     Justification for continued intermittent care: patient with wound care concerns as follows: right lower extremity, left lower extremity, left heel wounds and patient with ongoing need for skilled thrasher catheter changes     Cal De Luna MD notified of the following: the need for continued Skilled Nursing home health services for above reasons, plan of care including visit frequency of SN 1w1, 3w8, 2prn Skilled Nursing for : additional assessment and WOUND THRASHER IV ETC: wound care and thrasher care services. Skilled Reason for recert/summary of clinical condition:  wound care to right lower extremity, left lower extremity and left heel and thrasher catheter changes. Recertification assessment completed. Patient in bed at nursing arrival. Patient is alert and oriented x 4, pleasant and cooperative. Wound care provided to BLE and left heel. Patient tolerated fair due to pain. Pics taken, uploaded and measurements documented under wound assessment. Patient needs continued nursing visits for 1w1, 3w8 and 2prn due to deteriorating wounds from recent hospital stay. This patient is homebound for the following reasons Requires considerable and taxing effort to leave the home , Requires the assistance of 1 or more persons to leave the home  and Only leaves the home for medical reasons or [employer] services and are infrequent and of short duration for other reasons . Caregiver: relative. Caregiver assists with IADL's and ADL's. Medications reconciled and all medications are available in the home this visit. The following education was provided regarding medications: all medications reviewed and educated on to include: name, dose, route, frequency, side effects and effectivenes  Medications  are effective at this time.     High risk medication teaching regarding anticoagulants, antiplatelets, antibiotics, antipsychotics, hypoglycemic agents, or

## 2023-09-16 ENCOUNTER — HOME CARE VISIT (OUTPATIENT)
Age: 73
End: 2023-09-16
Payer: MEDICARE

## 2023-09-18 ENCOUNTER — HOME CARE VISIT (OUTPATIENT)
Age: 73
End: 2023-09-18
Payer: MEDICARE

## 2023-09-18 VITALS
TEMPERATURE: 97.1 F | DIASTOLIC BLOOD PRESSURE: 78 MMHG | HEART RATE: 67 BPM | RESPIRATION RATE: 16 BRPM | OXYGEN SATURATION: 98 % | SYSTOLIC BLOOD PRESSURE: 128 MMHG

## 2023-09-18 PROCEDURE — G0300 HHS/HOSPICE OF LPN EA 15 MIN: HCPCS

## 2023-09-18 NOTE — HOME HEALTH
Skilled Needs: Education and Wound Care   Caregiver involvement: Pt totally dependent in care due to DX and contractions   Medications reviewed and all medications are available in the home this visit. The following education was provided regarding medications:  DANIEL RITCHIE notified of any discrepancies/look a-like medications/medication interactions: DANIEL  Medications are effecrtive at this time. Home health supplies by type and quantity ordered/delivered this visit include:  Supplies available   Patient education provided this visit:  Reviewed to reported any redness, swelling, warmth, fever, chills, increased heart/respiratory rate, uncontrolled pain/tenderness, drainage:green/yellow/brown, or odor to MD immediately. Wound care completed as ordered pt supplies arrived as ordered. Pt due to have superpubic cath placed on the 25th. CG advised pt pulled his thrasher out the other day while being transfered from his wheelchair to the bed. CG replaced it with no issues or concerns. CG voiced he had some bleeding because the balloon was still inflated when it was dislodged.    Pancho education provided: DANIEL  Patient level of understanding of education provided: Terisa Fat all understanding to above education  Skilled Care Performed this visit: Education and Wound Care  Patient response to procedure performed: Minimal pain during dressing change   Agency Progress toward goals: Progressing toward interventions above  Patient's Progress towards personal goals: when patient reaches goals and medication is managed, and disease processes are understood patient agrees and understand that discharge will take place

## 2023-09-20 ENCOUNTER — HOME CARE VISIT (OUTPATIENT)
Age: 73
End: 2023-09-20
Payer: MEDICARE

## 2023-09-20 VITALS
RESPIRATION RATE: 16 BRPM | OXYGEN SATURATION: 96 % | DIASTOLIC BLOOD PRESSURE: 70 MMHG | HEART RATE: 89 BPM | SYSTOLIC BLOOD PRESSURE: 128 MMHG

## 2023-09-20 PROCEDURE — G0300 HHS/HOSPICE OF LPN EA 15 MIN: HCPCS

## 2023-09-20 NOTE — HOME HEALTH
Skilled Needs: Education and Wound Care   Caregiver involvement:Pt bedbound and requires assistance for everything due to DX   Medications reviewed and all medications are available in the home this visit. The following education was provided regarding medications:  DANIEL RITCHIE notified of any discrepancies/look a-like medications/medication interactions: DANIEL  Medications are effecrtive at this time. Home health supplies by type and quantity ordered/delivered this visit include:  Supplies available   Patient education provided this visit:  Reviewed to reported any redness, swelling, warmth, fever, chills, increased heart/respiratory rate, uncontrolled pain/tenderness, drainage:green/yellow/brown, or odor to MD immediately. Nurse arrived pt in bed CG is not avaialbe. Wound care completed as ordered. Pt due to have superpubic placed on the 25tth of Sept. Due to be an out patient procedure. Pt has supplies at this time and time set for friday.    Pancho education provided: DANIEL  Patient level of understanding of education provided: Howard Gonzalez all understanding to above education  Skilled Care Performed this visit: Education and Wound Care  Patient response to procedure performed: Minimal pain during dressing change   Agency Progress toward goals: Progressing toward interventions above  Patient's Progress towards personal goals: when patient reaches goals and medication is managed, and disease processes are understood patient agrees and understand that discharge will take place

## 2023-09-22 ENCOUNTER — HOME CARE VISIT (OUTPATIENT)
Age: 73
End: 2023-09-22
Payer: MEDICARE

## 2023-09-22 PROCEDURE — G0300 HHS/HOSPICE OF LPN EA 15 MIN: HCPCS

## 2023-09-22 NOTE — HOME HEALTH
Skilled Needs: Education and Wound Care   Caregiver involvement: Pt totally dependent on care from his PCG due to DX and wounds. Medications reviewed and all medications are available in the home this visit. The following education was provided regarding medications:  DANIEL RITCHIE notified of any discrepancies/look a-like medications/medication interactions: DANIEL  Medications are effecrtive at this time. Home health supplies by type and quantity ordered/delivered this visit include:  Supplies available   Patient education provided this visit:  Reviewed to reported any redness, swelling, warmth, fever, chills, increased heart/respiratory rate, uncontrolled pain/tenderness, drainage:green/yellow/brown, or odor to MD immediately. Pt due to have [id number] placed on monday, Sister will perform wound care and nurse will complete it on the other 2 days.  Pt has supplies at this time will continue to monitor for s/s of infection   Sharps education provided: DANIEL  Patient level of understanding of education provided: Ej Rm all understanding to above education  Skilled Care Performed this visit: Education and Wound Care  Patient response to procedure performed: Minimal pain during dressing change   Agency Progress toward goals: Progressing toward interventions above  Patient's Progress towards personal goals: when patient reaches goals and medication is managed, and disease processes are understood patient agrees and understand that discharge will take place

## 2023-09-25 ENCOUNTER — HOME CARE VISIT (OUTPATIENT)
Age: 73
End: 2023-09-25
Payer: MEDICARE

## 2023-09-25 NOTE — HOME HEALTH
Pt had a surgical procedure today so his CG is going to comlete the wound dressing change this afternoon.  MD foy and RN Notified

## 2023-09-27 ENCOUNTER — HOME CARE VISIT (OUTPATIENT)
Age: 73
End: 2023-09-27
Payer: MEDICARE

## 2023-09-27 PROCEDURE — G0300 HHS/HOSPICE OF LPN EA 15 MIN: HCPCS

## 2023-09-27 ASSESSMENT — ENCOUNTER SYMPTOMS: CONSTIPATION: 1

## 2023-09-27 NOTE — HOME HEALTH
Skilled Needs: Education and Wound Care   Caregiver involvement: Pt independent with care with the exception of wound treatment due to location   Medications reviewed and all medications are available in the home this visit. The following education was provided regarding medications:  DANIEL RITCHIE notified of any discrepancies/look a-like medications/medication interactions: DANIEL  Medications are effecrtive at this time. Home health supplies by type and quantity ordered/delivered this visit include:  Supplies available   Patient education provided this visit:  Reviewed to reported any redness, swelling, warmth, fever, chills, increased heart/respiratory rate, uncontrolled pain/tenderness, drainage:green/yellow/brown, or odor to MD immediately. Pt had superpubic thrasher placed monday, sister is cocnersn that he is peeing from his penis and nothing has been in his bag, educated her to call the surgeon with her concerns. Wound care was completed and dressings changed as ordered. Mild pain voiced during dressing changes. No change in medication at this time.   Pancho education provided: DANIEL  Patient level of understanding of education provided: Efra Godoyit all understanding to above education  Skilled Care Performed this visit: Education and Wound Care  Patient response to procedure performed: Minimal pain during dressing change   Agency Progress toward goals: Progressing toward interventions above  Patient's Progress towards personal goals: when patient reaches goals and medication is managed, and disease processes are understood patient agrees and understand that discharge will take place

## 2023-09-29 ENCOUNTER — HOME CARE VISIT (OUTPATIENT)
Age: 73
End: 2023-09-29
Payer: MEDICARE

## 2023-09-29 PROCEDURE — G0300 HHS/HOSPICE OF LPN EA 15 MIN: HCPCS

## 2023-09-29 NOTE — HOME HEALTH
Skilled Needs: Education and Wound Care   Caregiver involvement: Pt independent with care with the exception of wound treatment due to location   Medications reviewed and all medications are available in the home this visit. The following education was provided regarding medications:  DANIEL RITCHIE notified of any discrepancies/look a-like medications/medication interactions: DANIEL  Medications are effecrtive at this time. Home health supplies by type and quantity ordered/delivered this visit include:  Supplies available   Patient education provided this visit:  Reviewed to reported any redness, swelling, warmth, fever, chills, increased heart/respiratory rate, uncontrolled pain/tenderness, drainage:green/yellow/brown, or odor to MD immediately. Wound care compeleted as ordered. No change in medication at this time. CG able to assist with with dressing change during visits. Pts superpubic thrasher flowing with no concerns at this time. Pt has supplies at this time.    Pancho education provided: DANIEL  Patient level of understanding of education provided: Jayashree Kirby all understanding to above education  Skilled Care Performed this visit: Education and Wound Care  Patient response to procedure performed: Minimal pain during dressing change   Agency Progress toward goals: Progressing toward interventions above  Patient's Progress towards personal goals: when patient reaches goals and medication is managed, and disease processes are understood patient agrees and understand that discharge will take place

## 2023-10-02 ENCOUNTER — HOME CARE VISIT (OUTPATIENT)
Age: 73
End: 2023-10-02
Payer: MEDICARE

## 2023-10-02 PROCEDURE — G0300 HHS/HOSPICE OF LPN EA 15 MIN: HCPCS

## 2023-10-03 VITALS
DIASTOLIC BLOOD PRESSURE: 72 MMHG | TEMPERATURE: 97 F | OXYGEN SATURATION: 96 % | HEART RATE: 83 BPM | SYSTOLIC BLOOD PRESSURE: 124 MMHG | RESPIRATION RATE: 18 BRPM

## 2023-10-03 NOTE — HOME HEALTH
Skilled Needs: Education and Wound Care   Caregiver involvement: Pt independent with care with the exception of wound treatment due to location   Medications reviewed and all medications are available in the home this visit. The following education was provided regarding medications:  DANIEL RITCHIE notified of any discrepancies/look a-like medications/medication interactions: DANIEL  Medications are effecrtive at this time. Home health supplies by type and quantity ordered/delivered this visit include:  Supplies available   Patient education provided this visit:  Reviewed to reported any redness, swelling, warmth, fever, chills, increased heart/respiratory rate, uncontrolled pain/tenderness, drainage:green/yellow/brown, or odor to MD immediately. Wound care completed as ordered. Minimal pain voiced at this time. Pt due to see MD for follow up from super pubic thrasher.    Sharps education provided: DANIEL  Patient level of understanding of education provided: Edelmira Akins all understanding to above education  Skilled Care Performed this visit: Education and Wound Care  Patient response to procedure performed: Minimal pain during dressing change   Agency Progress toward goals: Progressing toward interventions above  Patient's Progress towards personal goals: when patient reaches goals and medication is managed, and disease processes are understood patient agrees and understand that discharge will take place

## 2023-10-04 ENCOUNTER — HOME CARE VISIT (OUTPATIENT)
Age: 73
End: 2023-10-04
Payer: MEDICARE

## 2023-10-04 VITALS
HEART RATE: 90 BPM | OXYGEN SATURATION: 96 % | TEMPERATURE: 97 F | DIASTOLIC BLOOD PRESSURE: 78 MMHG | SYSTOLIC BLOOD PRESSURE: 126 MMHG | RESPIRATION RATE: 16 BRPM

## 2023-10-04 PROCEDURE — G0300 HHS/HOSPICE OF LPN EA 15 MIN: HCPCS

## 2023-10-04 ASSESSMENT — ENCOUNTER SYMPTOMS: PAIN LOCATION - PAIN QUALITY: BURNING

## 2023-10-05 NOTE — HOME HEALTH
Skilled Needs: Education and Wound Care   Caregiver involvement: Pt independent with care with the exception of wound treatment due to location   Medications reviewed and all medications are available in the home this visit. The following education was provided regarding medications:  DANIEL RITCHIE notified of any discrepancies/look a-like medications/medication interactions: DANIEL  Medications are effecrtive at this time. Home health supplies by type and quantity ordered/delivered this visit include:  Supplies available   Patient education provided this visit:  Reviewed to reported any redness, swelling, warmth, fever, chills, increased heart/respiratory rate, uncontrolled pain/tenderness, drainage:green/yellow/brown, or odor to MD immediately. Nurse arrived pt in bed no distress noted. Wound care completed as ordered with pain during change today. No s/s of infection noted today. Pt due to see [facility] for follow up from surgery. Supplies available at this time.    Sharps education provided: DANIEL  Patient level of understanding of education provided: Armando Dawson all understanding to above education  Skilled Care Performed this visit: Education and Wound Care  Patient response to procedure performed: Minimal pain during dressing change   Agency Progress toward goals: Progressing toward interventions above  Patient's Progress towards personal goals: when patient reaches goals and medication is managed, and disease processes are understood patient agrees and understand that discharge will take place

## 2023-10-06 ENCOUNTER — HOME CARE VISIT (OUTPATIENT)
Age: 73
End: 2023-10-06
Payer: MEDICARE

## 2023-10-09 ENCOUNTER — HOME CARE VISIT (OUTPATIENT)
Age: 73
End: 2023-10-09
Payer: MEDICARE

## 2023-10-09 VITALS
OXYGEN SATURATION: 96 % | HEART RATE: 76 BPM | RESPIRATION RATE: 16 BRPM | DIASTOLIC BLOOD PRESSURE: 76 MMHG | SYSTOLIC BLOOD PRESSURE: 128 MMHG | TEMPERATURE: 98 F

## 2023-10-09 PROCEDURE — G0300 HHS/HOSPICE OF LPN EA 15 MIN: HCPCS

## 2023-10-09 NOTE — HOME HEALTH
Skilled Needs: Education and Wound Care   Caregiver involvement: Pt is totally dependent on care due to diagnosis  Medications reviewed and all medications are available in the home this visit. The following education was provided regarding medications:  DANIEL RITCHIE notified of any discrepancies/look a-like medications/medication interactions: DANIEL  Medications are effecrtive at this time. Home health supplies by type and quantity ordered/delivered this visit include:  Supplies available   Patient education provided this visit:  Reviewed to reported any redness, swelling, warmth, fever, chills, increased heart/respiratory rate, uncontrolled pain/tenderness, drainage:green/yellow/brown, or odor to MD immediately. Wound care completed as ordered with minimal pain voiced during dressing change. Wounds showing signs of improvement as evident in decrease in bleeding. Supplies ordered for wound care.    Pancho education provided: DANIEL  Patient level of understanding of education provided: Mario Fears all understanding to above education  Skilled Care Performed this visit: Education and Wound Care  Patient response to procedure performed: Minimal pain during dressing change   Agency Progress toward goals: Progressing toward interventions above  Patient's Progress towards personal goals: when patient reaches goals and medication is managed, and disease processes are understood patient agrees and understand that discharge will take place

## 2023-10-11 ENCOUNTER — HOME CARE VISIT (OUTPATIENT)
Age: 73
End: 2023-10-11
Payer: MEDICARE

## 2023-10-11 PROCEDURE — G0300 HHS/HOSPICE OF LPN EA 15 MIN: HCPCS

## 2023-10-11 NOTE — HOME HEALTH
Skilled Needs: Education and Wound Care   Caregiver involvement: Pt depends on CG and family for care due to DX   Medications reviewed and all medications are available in the home this visit. The following education was provided regarding medications:  DANIEL RITCHIE notified of any discrepancies/look a-like medications/medication interactions: DANIEL  Medications are effecrtive at this time. Home health supplies by type and quantity ordered/delivered this visit include:  Supplies available   Patient education provided this visit:  Reviewed to reported any redness, swelling, warmth, fever, chills, increased heart/respiratory rate, uncontrolled pain/tenderness, drainage:green/yellow/brown, or odor to MD immediately. Wound care completed as ordered by MD.  Assistance with wound care by sister. Supplies have not arrived at the time of the visit. No change in medication at this time.    Pancho education provided: DANIEL  Patient level of understanding of education provided: Adolfo Linder all understanding to above education  Skilled Care Performed this visit: Education and Wound Care  Patient response to procedure performed: Minimal pain during dressing change   Agency Progress toward goals: Progressing toward interventions above  Patient's Progress towards personal goals: when patient reaches goals and medication is managed, and disease processes are understood patient agrees and understand that discharge will take place

## 2023-10-13 ENCOUNTER — HOME CARE VISIT (OUTPATIENT)
Age: 73
End: 2023-10-13
Payer: MEDICARE

## 2023-10-13 PROCEDURE — G0300 HHS/HOSPICE OF LPN EA 15 MIN: HCPCS

## 2023-10-13 NOTE — HOME HEALTH
Skilled Needs: Education and Wound Care   Caregiver involvement: Pt totaly depends on family and CG for care daily due to DX   Medications reviewed and all medications are available in the home this visit. The following education was provided regarding medications:  DANIEL RITCHIE notified of any discrepancies/look a-like medications/medication interactions: DANIEL  Medications are effecrtive at this time. Home health supplies by type and quantity ordered/delivered this visit include:  Supplies available   Patient education provided this visit:  WOund care performed to both left and right les and heels. measurements taken with pictures. Called MD office for VO to change wound care. New orderes given and placed in the chart. No change in medications at this time. Sisiter handles medications daily for patient. Nurse educated pt and CG to decrease the amount of sugar he ets, to decrease the amount of carbs he consumes to help with wound healing.    Sharps education provided: DANIEL  Patient level of understanding of education provided: Jayashree Almyra all understanding to above education  Skilled Care Performed this visit: Education and Wound Care  Patient response to procedure performed: Minimal pain during dressing change   Agency Progress toward goals: Progressing toward interventions above  Patient's Progress towards personal goals: when patient reaches goals and medication is managed, and disease processes are understood patient agrees and understand that discharge will take place

## 2023-10-17 ENCOUNTER — HOME CARE VISIT (OUTPATIENT)
Age: 73
End: 2023-10-17
Payer: MEDICARE

## 2023-10-17 PROCEDURE — G0300 HHS/HOSPICE OF LPN EA 15 MIN: HCPCS

## 2023-10-17 NOTE — HOME HEALTH
Skilled Needs: Education and Wound Care   Caregiver involvement: Pt dependent in care due to DX and being bed bound  Medications reviewed and all medications are available in the home this visit. The following education was provided regarding medications:  DANIEL RITCHIE notified of any discrepancies/look a-like medications/medication interactions: DANIEL  Medications are effecrtive at this time. Home health supplies by type and quantity ordered/delivered this visit include:  Supplies available   Patient education provided this visit:  Reviewed to reported any redness, swelling, warmth, fever, chills, increased heart/respiratory rate, uncontrolled pain/tenderness, drainage:green/yellow/brown, or odor to MD immediately. Wound care completed as ordered. mild pain voiced during dressing change. supplies ordered for wound care.    Pancho education provided: DANIEL  Patient level of understanding of education provided: Marshall Osgood all understanding to above education  Skilled Care Performed this visit: Education and Wound Care  Patient response to procedure performed: Minimal pain during dressing change   Agency Progress toward goals: Progressing toward interventions above  Patient's Progress towards personal goals: when patient reaches goals and medication is managed, and disease processes are understood patient agrees and understand that discharge will take place

## 2023-10-20 ENCOUNTER — HOME CARE VISIT (OUTPATIENT)
Age: 73
End: 2023-10-20
Payer: MEDICARE

## 2023-10-20 VITALS
OXYGEN SATURATION: 98 % | RESPIRATION RATE: 18 BRPM | TEMPERATURE: 97 F | SYSTOLIC BLOOD PRESSURE: 108 MMHG | DIASTOLIC BLOOD PRESSURE: 68 MMHG | HEART RATE: 78 BPM

## 2023-10-20 PROCEDURE — G0300 HHS/HOSPICE OF LPN EA 15 MIN: HCPCS

## 2023-10-20 NOTE — HOME HEALTH
Skilled Needs: Education and Wound Care   Caregiver involvement: Pt dependent with all care    Medications reviewed and all medications are available in the home this visit. The following education was provided regarding medications:  DANIEL  MD notified of any discrepancies/look a-like medications/medication interactions: DANIEL  Medications are effecrtive at this time. Home health supplies by type and quantity ordered/delivered this visit include:  Supplies available   Patient education provided this visit:  Reviewed to reported any redness, swelling, warmth, fever, chills, increased heart/respiratory rate, uncontrolled pain/tenderness, drainage:green/yellow/brown, or odor to MD immediately. Wound care completed as ordered. supplies still have not arrived.  No concerns at this time   Sharps education provided: DANIEL  Patient level of understanding of education provided: Little Hager all understanding to above education  Skilled Care Performed this visit: Education and Wound Care  Patient response to procedure performed: Minimal pain during dressing change   Agency Progress toward goals: Progressing toward interventions above  Patient's Progress towards personal goals: when patient reaches goals and medication is managed, and disease processes are understood patient agrees and understand that discharge will take place

## 2023-10-23 ENCOUNTER — HOME CARE VISIT (OUTPATIENT)
Age: 73
End: 2023-10-23
Payer: MEDICARE

## 2023-10-23 PROCEDURE — G0300 HHS/HOSPICE OF LPN EA 15 MIN: HCPCS

## 2023-10-23 NOTE — HOME HEALTH
Skilled Needs: Education and Wound Care   Caregiver involvement: Pt independent with care with the exception of wound treatment due to location   Medications reviewed and all medications are available in the home this visit. The following education was provided regarding medications:  DANIEL RITCHIE notified of any discrepancies/look a-like medications/medication interactions: DANIEL  Medications are effecrtive at this time. Home health supplies by type and quantity ordered/delivered this visit include:  Supplies available   Patient education provided this visit:  Reviewed to reported any redness, swelling, warmth, fever, chills, increased heart/respiratory rate, uncontrolled pain/tenderness, drainage:green/yellow/brown, or odor to MD immediately. Wound care completed as ordered. Still waitinig on supplies. no change in condition at this time.   Pancho education provided: DANIEL  Patient level of understanding of education provided: Jonn Remy all understanding to above education  Skilled Care Performed this visit: Education and Wound Care  Patient response to procedure performed: Minimal pain during dressing change   Agency Progress toward goals: Progressing toward interventions above  Patient's Progress towards personal goals: when patient reaches goals and medication is managed, and disease processes are understood patient agrees and understand that discharge will take place

## 2023-10-26 ENCOUNTER — HOME CARE VISIT (OUTPATIENT)
Age: 73
End: 2023-10-26
Payer: MEDICARE

## 2023-10-26 VITALS
SYSTOLIC BLOOD PRESSURE: 130 MMHG | HEART RATE: 76 BPM | RESPIRATION RATE: 18 BRPM | OXYGEN SATURATION: 96 % | DIASTOLIC BLOOD PRESSURE: 78 MMHG | TEMPERATURE: 97 F

## 2023-10-26 PROCEDURE — G0300 HHS/HOSPICE OF LPN EA 15 MIN: HCPCS

## 2023-10-26 ASSESSMENT — ENCOUNTER SYMPTOMS: BOWEL INCONTINENCE: 1

## 2023-10-31 ENCOUNTER — HOME CARE VISIT (OUTPATIENT)
Age: 73
End: 2023-10-31
Payer: MEDICARE

## 2023-11-01 ENCOUNTER — HOME CARE VISIT (OUTPATIENT)
Age: 73
End: 2023-11-01
Payer: MEDICARE

## 2023-11-08 ENCOUNTER — HOME CARE VISIT (OUTPATIENT)
Age: 73
End: 2023-11-08

## 2023-11-08 VITALS
SYSTOLIC BLOOD PRESSURE: 108 MMHG | OXYGEN SATURATION: 98 % | RESPIRATION RATE: 14 BRPM | DIASTOLIC BLOOD PRESSURE: 60 MMHG | TEMPERATURE: 97.7 F | HEART RATE: 82 BPM

## 2023-11-08 PROCEDURE — G0299 HHS/HOSPICE OF RN EA 15 MIN: HCPCS

## 2023-11-08 ASSESSMENT — ENCOUNTER SYMPTOMS
PAIN LOCATION - PAIN QUALITY: ACHE
DYSPNEA ACTIVITY LEVEL: WHILE EATING
HEMOPTYSIS: 0
BOWEL INCONTINENCE: 1

## 2023-11-09 ENCOUNTER — HOME CARE VISIT (OUTPATIENT)
Age: 73
End: 2023-11-09
Payer: MEDICARE

## 2023-11-09 PROCEDURE — G0299 HHS/HOSPICE OF RN EA 15 MIN: HCPCS

## 2023-11-10 ENCOUNTER — HOME CARE VISIT (OUTPATIENT)
Age: 73
End: 2023-11-10
Payer: MEDICARE

## 2023-11-10 VITALS
RESPIRATION RATE: 16 BRPM | OXYGEN SATURATION: 98 % | HEART RATE: 65 BPM | SYSTOLIC BLOOD PRESSURE: 122 MMHG | TEMPERATURE: 97.7 F | DIASTOLIC BLOOD PRESSURE: 82 MMHG

## 2023-11-10 VITALS
DIASTOLIC BLOOD PRESSURE: 82 MMHG | OXYGEN SATURATION: 98 % | SYSTOLIC BLOOD PRESSURE: 122 MMHG | HEART RATE: 65 BPM | TEMPERATURE: 97.7 F | RESPIRATION RATE: 16 BRPM

## 2023-11-10 PROCEDURE — G0151 HHCP-SERV OF PT,EA 15 MIN: HCPCS

## 2023-11-10 PROCEDURE — G0300 HHS/HOSPICE OF LPN EA 15 MIN: HCPCS

## 2023-11-10 ASSESSMENT — ENCOUNTER SYMPTOMS: PAIN LOCATION - PAIN QUALITY: ACHING

## 2023-11-10 NOTE — HOME HEALTH
Pt was recently hospitalized for treatment of osteomyelitis of his feet    PMHx: Heartburn, HTN, HLD, CVA, STEMI    SUBJECTIVE: \"My feet hurt\"  LIVING SITUATION/PLOF: Pt lives with his sister in a one level home. He is bed/wc bound due to paralysis  CAREGIVER INVOLVEMENT/ ASSISTANCE NEEDED FOR: All care  MEDICATIONS REVIEWED AND UPDATED: Meds reviewed with no changes  NEXT MD APPT: 11/14 with Dr Mirna Mcconnell    ROM: B LE's with decreased knee flexion due to knee / contractures; ankles with limited dorsiflexion due to plantarflexion contractures  STRENGTH: 1+ hip flexion B LE's  WOUNDS: SN managing wounds on B feet  BED MOBILITY: DEP rolling and supine<>sit  TRANSFERS: Pt's sister transferred pt bed>w/c using sergei lift. She was able to place the pad I'ly and correctly and was able to correctly guide the PT to assist with the transfer. She states her  or other family members assist her to transfer pt. BALANCE: MAX/DEP to sit at EOB. HEP consisting of:  1. Get up into w/c daily  2. LE ROM - pt's sister demo's LE PROM  3. Deep breathing   Written instructions issued. Patient/caregiver verbalized understanding     PATIENT EDUCATION PROVIDED THIS VISIT: safety, HEP, transfers, deep breathing,     PATIENT RESPONSE TO EDUCATION PROVIDED: Pt/CG verbalizes understanding of all information and demo's back as appropriate   PATIENT RESPONSE TO EVALUATION/TREATMENT: Patient/CG demonstrated a positive outcome post treatment and reported increased comfort and increased confidence with transfers and mobility. Patient reported good understanding of the HEP and reports confidence in ability to complete the program as prescribed by PT.    ASSESSMENT AND CONTINUED NEED FOR THE FOLLOWING SKILLS:  Pt is referred for PT eval after hospitalization and OLINDA. Pt is quadraplegic and DEP for mobility. He has UE and LE contractures and his sister is I with ROM program.  Pt's sister is also I with sergei transfer.   Pt has all needed

## 2023-11-10 NOTE — HOME HEALTH
Skilled Needs: Education and Wound Care   Caregiver involvement: Pt independent with care with the exception of wound treatment due to location   Medications reviewed and all medications are available in the home this visit. The following education was provided regarding medications:  DANIEL RITCHIE notified of any discrepancies/look a-like medications/medication interactions: DANIEL  Medications are effecrtive at this time. Home health supplies by type and quantity ordered/delivered this visit include:  Supplies available   Patient education provided this visit:  Reviewed to reported any redness, swelling, warmth, fever, chills, increased heart/respiratory rate, uncontrolled pain/tenderness, drainage:green/yellow/brown, or odor to MD immediately. NUrse arrived pt sitting in his wheelchair no distress noted. Wound care completed as ordered. Wounds looking better since hospitalization and IV Antibiotics NO concerns at this time. Superpubic thrasher was changed in the hospital to a regular thrasher v/s the surgical one Flowing with no concerns at this time.    Sharps education provided: DANIEL  Patient level of understanding of education provided: Rayna Pinto all understanding to above education  Skilled Care Performed this visit: Education and Wound Care  Patient response to procedure performed: Minimal pain during dressing change   Agency Progress toward goals: Progressing toward interventions above  Patient's Progress towards personal goals: when patient reaches goals and medication is managed, and disease processes are understood patient agrees and understand that discharge will take place

## 2023-11-11 VITALS
SYSTOLIC BLOOD PRESSURE: 132 MMHG | TEMPERATURE: 97.4 F | RESPIRATION RATE: 16 BRPM | DIASTOLIC BLOOD PRESSURE: 76 MMHG | OXYGEN SATURATION: 97 % | HEART RATE: 82 BPM

## 2023-11-11 ASSESSMENT — ENCOUNTER SYMPTOMS: BOWEL INCONTINENCE: 1

## 2023-11-11 NOTE — HOME HEALTH
Skilled reason for visit: iv meds teaching to sister, SASH method, meropenem 1gm q 8hr x 37 days finishing 12/13. picc dressing chg done due to bloody drainage. Caregiver involvement: patients cg is sister and she is available as needed or assistance with IADL's, ADL's, meal prep, medication management, and taking patient to all doctors appointments. Medications reviewed and all medications are available in the home this visit. The following education was provided regarding medications, medication interactions, and look alike medications (specify): heparin, ns, meropenem  Medications are effective at this time. Medications reconciled and all meds in home    Home health supplies by type and quantity ordered/delivered this visit include: PT [id number]      Patient education provided this visit: see interventions    Patient level of understanding of education provided: pt is understanding of all procedures performed. Skilled Care Performed this visit: see notes, sn nursing assessment, pain, meds, vs    Patient response to procedure performed: patient tolerated procedure with no signs of discomfort, grimacing or pain, no complications or concerns noted. Agency Progress toward goals: Will continue to monitor patient each visit     Patient's Progress towards personal goals: will cont to monitor    Home exercise program: continue home exercises program as developed by physical therapy     Continued need for the following skills: Nursing and Physical Therapy    Plan for next visit: sn nursing assessment, meds, pain, vs    Patient and/or caregiver notified and agrees to changes in the Plan of Care YES      The following discharge planning was discussed with the pt/caregiver: Patient will be discharged once education has completed, patient is medically stable and pt/cg are able to independently manage medications and disease process.

## 2023-11-15 ENCOUNTER — HOME CARE VISIT (OUTPATIENT)
Age: 73
End: 2023-11-15
Payer: MEDICARE

## 2023-11-15 ENCOUNTER — HOSPITAL ENCOUNTER (OUTPATIENT)
Facility: HOSPITAL | Age: 73
Setting detail: SPECIMEN
Discharge: HOME | End: 2023-11-18

## 2023-11-15 LAB — SENTARA SPECIMEN COLLECTION: NORMAL

## 2023-11-15 PROCEDURE — G0299 HHS/HOSPICE OF RN EA 15 MIN: HCPCS

## 2023-11-15 PROCEDURE — G0152 HHCP-SERV OF OT,EA 15 MIN: HCPCS

## 2023-11-16 ENCOUNTER — HOME CARE VISIT (OUTPATIENT)
Age: 73
End: 2023-11-16
Payer: MEDICARE

## 2023-11-16 VITALS
SYSTOLIC BLOOD PRESSURE: 118 MMHG | TEMPERATURE: 98.7 F | OXYGEN SATURATION: 95 % | RESPIRATION RATE: 16 BRPM | HEART RATE: 62 BPM | DIASTOLIC BLOOD PRESSURE: 76 MMHG

## 2023-11-16 ASSESSMENT — ENCOUNTER SYMPTOMS: PAIN LOCATION - PAIN QUALITY: ACHE

## 2023-11-17 ENCOUNTER — HOME CARE VISIT (OUTPATIENT)
Age: 73
End: 2023-11-17
Payer: MEDICARE

## 2023-11-17 VITALS
RESPIRATION RATE: 18 BRPM | SYSTOLIC BLOOD PRESSURE: 122 MMHG | DIASTOLIC BLOOD PRESSURE: 80 MMHG | OXYGEN SATURATION: 98 % | HEART RATE: 67 BPM | TEMPERATURE: 97 F

## 2023-11-17 PROCEDURE — G0300 HHS/HOSPICE OF LPN EA 15 MIN: HCPCS

## 2023-11-17 ASSESSMENT — ENCOUNTER SYMPTOMS: BOWEL INCONTINENCE: 1

## 2023-11-17 NOTE — CASE COMMUNICATION
Occupational Therapy Evaluation    1w1    Mr. Buzz Vieira is bed bound. He has a custom w/c and his family independently transfer him in the w/c daily with use of the sergei lift. His sister is his paid caregiver and she reports that she and her niece assist Mr. Buzz Vieira with all ADL tasks. Mr. Buzz Vieira has severe limitations with UE function and ROM. He is unable to grasp items or functionally move either UE. He is rigid and has only margin al mobility. Discussed importance of daily ROM to allow for ease and comfort when assisting pt with his ADL. Sister states that she does his daily ROM but is worried about disrupting his pick line on the R side of his chest.  Informed sister that pt's movements are very marginal at best and the movements performed will not complicate his pick line. This was discussed with nursing to confirm. Pt's sister positions his drink at EOB wi th a clamped cup braun and uses a long straw so that pt may take sips from his drink independently. Caregiver declines need for further caregiver training and reports that she is independent with helping pt with daily ADL and transfers. She denies having any concerns over pt's current DME. Mr. Buzz Vieira is not appropriate for further skilled OT at this time. Pt and sister state understanding.

## 2023-11-18 NOTE — HOME HEALTH
Skilled Needs: Education and Wound Care   Caregiver involvement: Pt dependent with all care due to dx   Medications reviewed and all medications are available in the home this visit. The following education was provided regarding medications:  DANIEL RITCHIE notified of any discrepancies/look a-like medications/medication interactions: DANIEL  Medications are effecrtive at this time. Home health supplies by type and quantity ordered/delivered this visit include:  Supplies available   Patient education provided this visit:  Reviewed to reported any redness, swelling, warmth, fever, chills, increased heart/respiratory rate, uncontrolled pain/tenderness, drainage:green/yellow/brown, or odor to MD immediately. WOund care completed as ordered. Pts CG is still administering his antibiotics as ordered. Pts supplies arrived. No other concerns at this time. Set schedule for next week for visit.    Sharps education provided: DANIEL  Patient level of understanding of education provided: Laure Ochoa all understanding to above education  Skilled Care Performed this visit: Education and Wound Care  Patient response to procedure performed: No pain during dressing change   Agency Progress toward goals: Progressing toward interventions above  Patient's Progress towards personal goals: when patient reaches goals and medication is managed, and disease processes are understood patient agrees and understand that discharge will take place

## 2023-11-19 VITALS
SYSTOLIC BLOOD PRESSURE: 122 MMHG | OXYGEN SATURATION: 96 % | TEMPERATURE: 98.2 F | DIASTOLIC BLOOD PRESSURE: 72 MMHG | HEART RATE: 42 BPM | RESPIRATION RATE: 16 BRPM

## 2023-11-19 ASSESSMENT — ENCOUNTER SYMPTOMS
BOWEL INCONTINENCE: 1
DYSPNEA ACTIVITY LEVEL: AFTER AMBULATING LESS THAN 10 FT

## 2023-11-20 ENCOUNTER — HOSPITAL ENCOUNTER (OUTPATIENT)
Facility: HOSPITAL | Age: 73
Setting detail: SPECIMEN
Discharge: HOME | End: 2023-11-23

## 2023-11-20 ENCOUNTER — HOME CARE VISIT (OUTPATIENT)
Age: 73
End: 2023-11-20
Payer: MEDICARE

## 2023-11-20 LAB — SENTARA SPECIMEN COLLECTION: NORMAL

## 2023-11-20 PROCEDURE — G0299 HHS/HOSPICE OF RN EA 15 MIN: HCPCS

## 2023-11-23 ENCOUNTER — HOME CARE VISIT (OUTPATIENT)
Age: 73
End: 2023-11-23
Payer: MEDICARE

## 2023-11-23 VITALS
RESPIRATION RATE: 16 BRPM | SYSTOLIC BLOOD PRESSURE: 126 MMHG | OXYGEN SATURATION: 97 % | TEMPERATURE: 97.9 F | DIASTOLIC BLOOD PRESSURE: 76 MMHG | HEART RATE: 89 BPM

## 2023-11-23 ASSESSMENT — ENCOUNTER SYMPTOMS
BOWEL INCONTINENCE: 1
PAIN LOCATION - PAIN QUALITY: SHARP
DYSPNEA ACTIVITY LEVEL: AT REST

## 2023-11-24 NOTE — HOME HEALTH
Skilled reason for visit: wound care to BLE, iv meds teaching to SANJANA palumbo method, meropenem 1gm q 8hr x 37 days finishing 12/13. picc dressing chg and labs    Caregiver involvement: patients cg is Osorio palumbo,  and she is available as needed or assistance with IADL's, ADL's, meal prep, medication management, and taking patient to all doctors appointments. Medications reviewed and all medications are available in the home this visit. The following education was provided regarding medications, medication interactions, and look alike medications (specify): heparin, ns, meropenem         Medications are effective at this time. Medications reconciled and all meds in home           Home health supplies by type and quantity ordered/delivered this visit include: need more picc dsg kits            Patient education provided this visit: see interventions                   Patient level of understanding of education provided: pt is understanding of all procedures performed. Skilled Care Performed this visit: see notes, sn nursing assessment, pain, meds, vs                   Patient response to procedure performed: patient tolerated procedure with no signs of discomfort, grimacing or pain, no complications or concerns noted. Agency Progress toward goals:  Will continue to monitor patient each visit                    Patient's Progress towards personal goals: will cont to monitor                   Home exercise program: continue home exercises program as developed by physical therapy                    Continued need for the following skills: Nursing and Physical Therapy                   Plan for next visit: wound care, sn nursing assessment, meds, pain, vs      Patient and/or caregiver notified and agrees to changes in the Plan of Care YES          The following discharge planning was discussed with the pt/caregiver: Patient will be discharged once education has

## 2023-11-25 ENCOUNTER — HOME CARE VISIT (OUTPATIENT)
Age: 73
End: 2023-11-25
Payer: MEDICARE

## 2023-11-27 ENCOUNTER — HOSPITAL ENCOUNTER (OUTPATIENT)
Facility: HOSPITAL | Age: 73
Setting detail: SPECIMEN
Discharge: HOME | End: 2023-11-30

## 2023-11-27 ENCOUNTER — HOME CARE VISIT (OUTPATIENT)
Age: 73
End: 2023-11-27
Payer: MEDICARE

## 2023-11-27 VITALS
HEART RATE: 82 BPM | TEMPERATURE: 98.7 F | DIASTOLIC BLOOD PRESSURE: 77 MMHG | OXYGEN SATURATION: 96 % | RESPIRATION RATE: 17 BRPM | SYSTOLIC BLOOD PRESSURE: 128 MMHG

## 2023-11-27 LAB — SENTARA SPECIMEN COLLECTION: NORMAL

## 2023-11-27 PROCEDURE — G0299 HHS/HOSPICE OF RN EA 15 MIN: HCPCS

## 2023-11-27 ASSESSMENT — ENCOUNTER SYMPTOMS
BOWEL INCONTINENCE: 1
DYSPNEA ACTIVITY LEVEL: AFTER AMBULATING LESS THAN 10 FT
PAIN LOCATION - PAIN QUALITY: THROBBING
CONTUSION: 1

## 2023-11-28 NOTE — HOME HEALTH
Skilled reason for visit: wound care to BLE, PICC line dsg change, labs obtained,  meropenem finishing 12/13. Caregiver involvement: patients cg is sister, Barbara Edwards,  and she is available as needed or assistance with IADL's, ADL's, meal prep, medication management, and taking patient to all doctors appointments. Medications reviewed and all medications are available in the home this visit. The following education was provided regarding medications, medication interactions, and look alike medications (specify): heparin, ns, meropenem      Medications are effective at this time. Medications reconciled and all meds in home      Home health supplies by type and quantity ordered/delivered this visit include: need more picc dsg kits      Patient education provided this visit: see interventions      Patient level of understanding of education provided: pt is understanding of all procedures performed. Skilled Care Performed this visit: see notes, sn nursing assessment, pain, meds, vs        Patient response to procedure performed: patient tolerated procedure with no signs of discomfort, grimacing or pain, no complications or concerns noted. Agency Progress toward goals:  Will continue to monitor patient each visit                                 Patient's Progress towards personal goals: will cont to monitor                                       Home exercise program: continue home exercises program as developed by physical therapy                                        Continued need for the following skills: Nursing and Physical Therapy                                       Plan for next visit: wound care, sn nursing assessment, meds, pain, vs              Patient and/or caregiver notified and agrees to changes in the Plan of Care YES          The following discharge planning was discussed with the pt/caregiver: Patient will be discharged once education has completed, patient is medically

## 2023-11-30 ENCOUNTER — HOME CARE VISIT (OUTPATIENT)
Age: 73
End: 2023-11-30
Payer: MEDICARE

## 2023-11-30 VITALS
OXYGEN SATURATION: 96 % | RESPIRATION RATE: 16 BRPM | DIASTOLIC BLOOD PRESSURE: 70 MMHG | SYSTOLIC BLOOD PRESSURE: 132 MMHG | TEMPERATURE: 97 F | HEART RATE: 76 BPM

## 2023-11-30 PROCEDURE — G0300 HHS/HOSPICE OF LPN EA 15 MIN: HCPCS

## 2023-11-30 ASSESSMENT — ENCOUNTER SYMPTOMS: BOWEL INCONTINENCE: 1

## 2023-11-30 NOTE — HOME HEALTH
Skilled Needs: Education and Wound Care   Caregiver involvement: Pt dependent in care due to DX   Medications reviewed and all medications are available in the home this visit. The following education was provided regarding medications:  DANIEL RITCHIE notified of any discrepancies/look a-like medications/medication interactions: NA  Medications are effecrtive at this time. Home health supplies by type and quantity ordered/delivered this visit include:  Supplies available   Patient education provided this visit:  Left heel is closed with slight drainiage from lateral left calf. Right heel is almost closed with minimal rainage from opening on right calf. Wound care completed as ordered. No pain verbalied from pt this visit. CG Unavailable this visit had an MD appt. Pt has supplies this visit.    Sharps education provided: DANIEL  Patient level of understanding of education provided: Yaz Méndez all understanding to above education  Skilled Care Performed this visit: Education and Wound Care  Patient response to procedure performed: Minimal pain during dressing change   Agency Progress toward goals: Progressing toward interventions above  Patient's Progress towards personal goals: when patient reaches goals and medication is managed, and disease processes are understood patient agrees and understand that discharge will take place

## 2023-12-01 ENCOUNTER — HOME CARE VISIT (OUTPATIENT)
Age: 73
End: 2023-12-01
Payer: MEDICARE

## 2023-12-04 ENCOUNTER — HOME CARE VISIT (OUTPATIENT)
Age: 73
End: 2023-12-04
Payer: MEDICARE

## 2023-12-04 ENCOUNTER — HOSPITAL ENCOUNTER (OUTPATIENT)
Facility: HOSPITAL | Age: 73
Setting detail: SPECIMEN
Discharge: HOME | End: 2023-12-07

## 2023-12-04 VITALS
SYSTOLIC BLOOD PRESSURE: 120 MMHG | OXYGEN SATURATION: 96 % | DIASTOLIC BLOOD PRESSURE: 72 MMHG | RESPIRATION RATE: 16 BRPM | TEMPERATURE: 98.4 F | HEART RATE: 70 BPM

## 2023-12-04 LAB — SENTARA SPECIMEN COLLECTION: NORMAL

## 2023-12-04 PROCEDURE — G0299 HHS/HOSPICE OF RN EA 15 MIN: HCPCS

## 2023-12-04 ASSESSMENT — ENCOUNTER SYMPTOMS
PAIN LOCATION - PAIN QUALITY: THROBBING
TROUBLE SWALLOWING: 1
DYSPNEA ACTIVITY LEVEL: AT REST

## 2023-12-05 NOTE — HOME HEALTH
Skilled reason for visit: wound care to BLE, PICC line dsg change, labs obtained,  meropenem finishing 12/13. Caregiver involvement: patients cg is sisterCelina,  and she is available as needed or assistance with IADL's, ADL's, meal prep, medication management, and taking patient to all doctors appointments. Medications reviewed and all medications are available in the home this visit. The following education was provided regarding medications, medication interactions, and look alike medications (specify): heparin, ns, meropenem    Medications are effective at this time. Medications reconciled and all meds in home  Home health supplies by type and quantity ordered/delivered this visit include: in home and left green caps, and thrasher securement device          Patient education provided this visit: see interventions              Patient level of understanding of education provided: pt is understanding of all procedures performed. Skilled Care Performed this visit: see notes, sn nursing assessment, pain, meds, vs       Patient response to procedure performed: patient tolerated procedure with no signs of discomfort, grimacing or pain, no complications or concerns noted. Agency Progress toward goals: Will continue to monitor patient each visit       Patient's Progress towards personal goals: will cont to monitor      Home exercise program: continue home exercises program as developed by physical therapy     Continued need for the following skills: Nursing and Physical Therapy        Plan for next visit: wound care, sn nursing assessment, meds, pain, vs      Patient and/or caregiver notified and agrees to changes in the Plan of Care YES        The following discharge planning was discussed with the pt/caregiver: Patient will be discharged once education has completed, patient is medically stable and pt/cg are able to independently manage medications and disease process.

## 2023-12-07 ENCOUNTER — HOME CARE VISIT (OUTPATIENT)
Age: 73
End: 2023-12-07
Payer: MEDICARE

## 2023-12-07 VITALS
OXYGEN SATURATION: 97 % | RESPIRATION RATE: 18 BRPM | DIASTOLIC BLOOD PRESSURE: 70 MMHG | HEART RATE: 87 BPM | SYSTOLIC BLOOD PRESSURE: 124 MMHG | TEMPERATURE: 98.1 F

## 2023-12-07 PROCEDURE — G0300 HHS/HOSPICE OF LPN EA 15 MIN: HCPCS

## 2023-12-07 ASSESSMENT — ENCOUNTER SYMPTOMS: BOWEL INCONTINENCE: 1

## 2023-12-07 NOTE — HOME HEALTH
Skilled Needs: Education and Wound Care   Caregiver involvement: Pt independent with care with the exception of wound treatment due to location   Medications reviewed and all medications are available in the home this visit. The following education was provided regarding medications:  DANIEL RITCHIE notified of any discrepancies/look a-like medications/medication interactions: DANIEL  Medications are effecrtive at this time. Home health supplies by type and quantity ordered/delivered this visit include:  Supplies available   Patient education provided this visit:  Reviewed to reported any redness, swelling, warmth, fever, chills, increased heart/respiratory rate, uncontrolled pain/tenderness, drainage:green/yellow/brown, or odor to MD immediately. FOleyc hanged today due to unable to flush 1800 CC of urine return once new thrasher placed. Wound care completed as ordered, Pts last day of antibiotic is the 13th.    Pancho education provided: DANIEL  Patient level of understanding of education provided: Doyal Min all understanding to above education  Skilled Care Performed this visit: Education and Wound Care  Patient response to procedure performed: Minimal pain during dressing change   Agency Progress toward goals: Progressing toward interventions above  Patient's Progress towards personal goals: when patient reaches goals and medication is managed, and disease processes are understood patient agrees and understand that discharge will take place

## 2023-12-11 ENCOUNTER — HOSPITAL ENCOUNTER (OUTPATIENT)
Facility: HOSPITAL | Age: 73
Setting detail: SPECIMEN
Discharge: HOME | End: 2023-12-14

## 2023-12-11 ENCOUNTER — HOME CARE VISIT (OUTPATIENT)
Age: 73
End: 2023-12-11
Payer: MEDICARE

## 2023-12-11 VITALS
TEMPERATURE: 98.2 F | DIASTOLIC BLOOD PRESSURE: 58 MMHG | OXYGEN SATURATION: 100 % | SYSTOLIC BLOOD PRESSURE: 106 MMHG | HEART RATE: 40 BPM | RESPIRATION RATE: 14 BRPM

## 2023-12-11 LAB — SENTARA SPECIMEN COLLECTION: NORMAL

## 2023-12-11 PROCEDURE — G0299 HHS/HOSPICE OF RN EA 15 MIN: HCPCS

## 2023-12-11 ASSESSMENT — ENCOUNTER SYMPTOMS: BOWEL INCONTINENCE: 1

## 2023-12-12 NOTE — HOME HEALTH
Skilled reason for visit: IV care/labs    Caregiver involvement:  perrform IV administration and wound care non nursing days. Medications reviewed and all medications are available in the home this visit. The following education was provided regarding medications:  reviewed all medication bottles in home for frequency, side effects and precautions. verbalized understanding      Medications are effective at this time. Home health supplies by type and quantity ordered/delivered this visit include: IV and wound care supplies in the home. Patient education provided this visit: see intervention tab. Patient level of understanding of education provided: see intervention tab for level of understanding. Patient response to procedure performed:  tolerated IV care without complaints of pain    Agency Progress toward goals: see intervention tab for progressing toward goals        Patient's Progress towards personal goals: see intervention tab for progressing toward goals          Home exercise program: cont to take all medications/diet as prescrived, follow pressure ulcer precautions, follow all fall precautions and use any assistive devices reccomended by therapy or medical providers, reported any abnormal s/sx of infection. IV complications to MD immediately, monitor daily weights. disease process teaching packets/ home care booklet for reference. call home care for any concerns/questions. keep all medical appts. called DR Tarsha Martinez office due to pulse was 37-40 on pulse ox and manual. no complaints of chest pain, increased SOB, dizziness or stroke s/sx. Nurse advised to go to ER. sister reported that no medications was given and nurse reviewed medications with bottles in the home. sister reported that will perform wound care and send to ER. Continued need for the following skills: Nursing.     Plan for next visit: wound care/IV care    Patient and/or caregiver notified and agrees to changes

## 2023-12-14 ENCOUNTER — HOME CARE VISIT (OUTPATIENT)
Age: 73
End: 2023-12-14

## 2024-01-02 ENCOUNTER — HOME CARE VISIT (OUTPATIENT)
Age: 74
End: 2024-01-02
Payer: MEDICARE

## 2024-01-02 VITALS
RESPIRATION RATE: 18 BRPM | HEART RATE: 68 BPM | TEMPERATURE: 97 F | SYSTOLIC BLOOD PRESSURE: 122 MMHG | DIASTOLIC BLOOD PRESSURE: 78 MMHG | OXYGEN SATURATION: 94 %

## 2024-01-02 PROCEDURE — G0299 HHS/HOSPICE OF RN EA 15 MIN: HCPCS

## 2024-01-02 ASSESSMENT — ENCOUNTER SYMPTOMS
DYSPNEA ACTIVITY LEVEL: AFTER AMBULATING 10 - 20 FT
PAIN LOCATION - PAIN QUALITY: DULL ACHE
HEMOPTYSIS: 0

## 2024-01-03 ENCOUNTER — HOME CARE VISIT (OUTPATIENT)
Age: 74
End: 2024-01-03
Payer: MEDICARE

## 2024-01-03 VITALS
SYSTOLIC BLOOD PRESSURE: 124 MMHG | OXYGEN SATURATION: 90 % | TEMPERATURE: 96 F | HEART RATE: 74 BPM | DIASTOLIC BLOOD PRESSURE: 72 MMHG

## 2024-01-03 PROCEDURE — G0151 HHCP-SERV OF PT,EA 15 MIN: HCPCS

## 2024-01-03 NOTE — HOME HEALTH
PMHx: H+P per the hospital 12/25/23 This is a 73-year-old -American male with a past medical history significant forC3-C4 quadriplegia due to spinal cord infection with prior cervical decompression in 2021, neurogenic bladder requiring suprapubic catheter placement, paroxysmal atrial fibrillation, sick sinus syndrome, CAD was admitted due to 1 day onset of worsening abdominal pain found to have multiple dilated loops of small bowel with air-fluid levels on CT imaging concerning for ileus, admitted for further evaluation and management.Pt went back to the hospital on 12/30/23Pt presents to ED with anxiety. Pt was discharged a few hours before returning to ED. Concern for \"not breathing right.\"Pt with hx of quadriplegia and neurogenic bladder with suprapubic cath. Bedbound at baseline. Pt currently A+Ox4. RR even and unlabored. Resting in position comfort on stretcher talking on phone.     SUBJECTIVE:Pt and sister reported that pt has had multiple bowel movements this morning. Pt sister gave him an enema this and it is working. Pt sister reports that pt has been up in wc multiple time since coming home   LIVING SITUATION: pt lives with sister in a mutli level home. pt lives in a garage that made into a room for him. Pt has private duty aide assitance 5 days a week for 5 hrs   REQUIRES CAREGIVER ASSISTANCE FOR: transportation, medications, ADSL,IADLS   PLOF: Pt is dependend for all functional mobilty.Pt is dependent for dressing and bathing. Pt does have personal aide assistance   MEDICATIONS REVIEWED AND RECONCILED: no changes   NEXT MD APPT:  1/15/24  EQUIPMENT:hospital bed. Custom manual wc, sergei lift, sit to stand lift   ROM:Pt has increased extenson tone B LE it was able to be broken with prolonged stretching. Pt has B wirst flexon contracturs with B fingers in extension. Pt R UE has an elbow extension contracture L UE elbow has a flexion contracture to about 90degress. pt has decreased shld

## 2024-01-03 NOTE — HOME HEALTH
Skilled services/Home bound verification:      Skilled Reason for admission/summary of clinical condition: 72 y/o male readmitted to [facility] 12/30 r/t SOB. Pt with BLE wound and suprapubic cath. PT, OT to continue eval/treat.    This patient is homebound for the following reasons Requires considerable and taxing effort to leave the home  and Only leaves the home for medical reasons or [employer] services and are infrequent and of short duration for other reasons.    Skilled care: Teaching disease, medication management, completed assessment, performed BLE wound (see wound addendum), No open wound to sacral area. Completed/explained OLINDA, discussed POC and wound treatment. SN called PCP Lance Mckeon MD, sopke to answering service and left message regarding BLE wound care treatment used today. Awaiting for reply.    Patient response to procedure performed: Pt tolerated well during the assessment procedure with no c/o.    Caregiver: Caregiver/sister available to assist with ADL's, assist with daily meals, administer with daily medications, transfer, run errands and accompany to MD appt prn.  Medications reconciled and all medications listed are available in the home this visit. Discussed taking daily meds on timely basis and taking with food.        The following education was provided regarding medications: Aspirin: abdominal cramps, bloody or black and tarry stool, bloody/dark urine, chest pain discomfort, confusion and constipation.. Discussed Tramadol  s/e: constipation, dry mouth, sweating, N/V and low energy. Notify MD if any of these S/S present.  Medications are somewhat effective at this time.      High risk medication teaching regarding anticoagulants, hyperglycemic agents or opoid narcotics performed (specify): Discussed s/e with antiplatelet and opioids as mentioned above.    MD notified of any discrepancies/look a like medications/medication interactions N/A    Home health supplies by type and quantity

## 2024-01-04 ENCOUNTER — HOME CARE VISIT (OUTPATIENT)
Age: 74
End: 2024-01-04
Payer: MEDICARE

## 2024-01-04 VITALS
SYSTOLIC BLOOD PRESSURE: 114 MMHG | RESPIRATION RATE: 17 BRPM | OXYGEN SATURATION: 97 % | HEART RATE: 74 BPM | TEMPERATURE: 98.3 F | DIASTOLIC BLOOD PRESSURE: 68 MMHG

## 2024-01-04 VITALS — HEART RATE: 75 BPM | OXYGEN SATURATION: 95 % | RESPIRATION RATE: 18 BRPM

## 2024-01-04 PROCEDURE — G0152 HHCP-SERV OF OT,EA 15 MIN: HCPCS

## 2024-01-04 PROCEDURE — G0300 HHS/HOSPICE OF LPN EA 15 MIN: HCPCS

## 2024-01-04 ASSESSMENT — ENCOUNTER SYMPTOMS: PAIN LOCATION - PAIN QUALITY: ACHE

## 2024-01-04 NOTE — HOME HEALTH
Caregiver involvement: Lucy (sister) lives with pt and assists with all ADL and mobility    Medications reviewed and all medications are available in the home this visit.    The following education was provided regarding medications, medication interactions, and look alike medications (specify): Continue as directed by MD.    Medications  are effective at this time.      Patient education provided this visit: see ADL note    Sharps education provided:  na    Patient level of understanding of education provided: see ADL note    Skilled Care Performed this visit: Completed OT evaluation and assessment for safety with ADL and mobility.    Modified Barthel ADL Index   Bowels   (x ) 0 = Incontinent or needs enemas   ( ) 1 = Occasional Accident   ( ) 2 = Continent   Bladder   (x ) 0 = Incontinent   ( ) 1 = Occasional accident (1x/wk)   ( ) 2 = Continent   Grooming   (x ) 0 = Needs help with personal care   ( ) 1 = Independent (including face, hair, teeth, shaving)   Toilet Use   (x ) 0 = Dependent   ( ) 1 = Needs some help   ( ) 2 = Independent   Feeding   (x ) 0 = Unable   ( ) 1 = Needs help, e.g. cutting   ( ) 2 = Independent   Transfers   (bed to chair and back)   (x ) 0 = Unable, no sitting balance   ( ) 1 = Major help (1 or 2 people), can sit   ( ) 2 = Minor help (verbal or physical)   ( ) 3 = Independent   Mobility   (x ) 0 = Immobile   ( ) 1 = Wheelchair independent (including corners)   ( ) 2 = Walks with the help of 1 person (physical or verbal help)   ( ) 3 = Independent (may use aid)   Dressing   (x ) 0 = Unable   ( ) 1 = Needs help; can do ~1/2 unaided   ( ) 2 = Independent   Stairs   (x ) 0 = Unable   ( ) 1 = Needs help (verbal or physical)   ( ) 2 = Independent   Bathing   ( x) 0 = Dependent   ( ) 1 = Independent   Total: (0)   Scores <15 usually represent moderate disability   Scores <10 usually represent severe disability      Patient response to procedure performed:  Mr. Mart had a positive response

## 2024-01-05 NOTE — HOME HEALTH
Skilled Needs: Education and Wound Care   Caregiver involvement: Pt dependent in care due to DX from PCA and family   Medications reviewed and all medications are available in the home this visit.    The following education was provided regarding medications:  DANIEL RITCHIE notified of any discrepancies/look a-like medications/medication interactions: DANIEL  Medications are effecrtive at this time.    Home health supplies by type and quantity ordered/delivered this visit include:  Supplies available   Patient education provided this visit:  Reviewed to reported any redness, swelling, warmth, fever, chills, increased heart/respiratory rate, uncontrolled pain/tenderness, drainage:green/yellow/brown, or odor to MD immediately. Nurse arrived pt in bed resting no distress noted. Wound care completed to bilateral legs. Wound look well compaired to last time seeing wounds. [place] was not draining properly changed as ordered. Urine return was adequit. supplies ordered.   Sharps education provided: DANIEL  Patient level of understanding of education provided: Verbalzied all understanding to above education  Skilled Care Performed this visit: Education and Wound Care  Patient response to procedure performed: Minimal pain during dressing change   Agency Progress toward goals: Progressing toward interventions above  Patient's Progress towards personal goals: when patient reaches goals and medication is managed, and disease processes are understood patient agrees and understand that discharge will take place

## 2024-01-05 NOTE — CASE COMMUNICATION
Occupational Therapy Evaluation    1w1    Mr. Mart is familiar to this OT from prior episodes of care.  He remains bed bound but has a custom manual w/c and his family independently transfers him in the w/c daily with use of the sergei lift. His sister is present during today's evaluation and reports that she has been getting him out of bed daily.  She continues to be his paid caregiver and she reports that she and her niece assist Mr. Mart with all ADL tasks. Mr. Mart continues to have severe limitations with UE function and ROM. He is unable to grasp items or functionally move either UE. He remains rigid and has only marginal mobility. Discussed importance of daily ROM to allow for ease and comfort when assisting pt with his ADL.  Pt's sister reports compliance with daily ROM.  She reports that she tells him to try and relax and moves slowly to achieve optimal  range.  Pt continues to use a long straw to drink from his cup that is positioned in a clamped cup braun on bedside table so that he may take sips from his drink independently. Caregiver declines need for further caregiver training and reports that she is independent with helping pt with daily ADL and transfers. She denies having any concerns over pt's current DME. Mr. Mart remains at baseline and is not appropriate for further skill ed OT at this time. Pt and sister state understanding.

## 2024-01-09 ENCOUNTER — HOME CARE VISIT (OUTPATIENT)
Age: 74
End: 2024-01-09
Payer: MEDICARE